# Patient Record
Sex: FEMALE
[De-identification: names, ages, dates, MRNs, and addresses within clinical notes are randomized per-mention and may not be internally consistent; named-entity substitution may affect disease eponyms.]

---

## 2024-03-04 PROBLEM — Z00.00 ENCOUNTER FOR PREVENTIVE HEALTH EXAMINATION: Status: ACTIVE | Noted: 2024-03-04

## 2024-03-19 ENCOUNTER — APPOINTMENT (OUTPATIENT)
Dept: OBGYN | Facility: CLINIC | Age: 24
End: 2024-03-19
Payer: MEDICAID

## 2024-03-19 VITALS
WEIGHT: 155 LBS | HEIGHT: 63 IN | BODY MASS INDEX: 27.46 KG/M2 | SYSTOLIC BLOOD PRESSURE: 116 MMHG | DIASTOLIC BLOOD PRESSURE: 74 MMHG

## 2024-03-19 DIAGNOSIS — N91.2 AMENORRHEA, UNSPECIFIED: ICD-10-CM

## 2024-03-19 DIAGNOSIS — Z01.419 ENCOUNTER FOR GYNECOLOGICAL EXAMINATION (GENERAL) (ROUTINE) W/OUT ABNORMAL FINDINGS: ICD-10-CM

## 2024-03-19 PROCEDURE — 99385 PREV VISIT NEW AGE 18-39: CPT

## 2024-03-19 RX ORDER — ASCORBIC ACID, CHOLECALCIFEROL, .ALPHA.-TOCOPHEROL ACETATE, DL-, PYRIDOXINE, FOLIC ACID, CYANOCOBALAMIN, CALCIUM, FERROUS FUMARATE, MAGNESIUM, DOCONEXENT 85; 200; 10; 25; 1; 12; 140; 27; 45; 300 [IU]/1; [IU]/1; [IU]/1; [IU]/1; MG/1; UG/1; MG/1; MG/1; MG/1; MG/1
27-0.6-0.4-3 CAPSULE, GELATIN COATED ORAL
Qty: 90 | Refills: 3 | Status: ACTIVE | COMMUNITY
Start: 2024-03-19 | End: 1900-01-01

## 2024-03-19 RX ORDER — ASPIRIN 81 MG/1
81 TABLET, DELAYED RELEASE ORAL
Qty: 150 | Refills: 2 | Status: ACTIVE | COMMUNITY
Start: 2024-03-19 | End: 1900-01-01

## 2024-03-19 RX ORDER — DOXYLAMINE SUCCINATE AND PYRIDOXINE HYDROCHLORIDE 10; 10 MG/1; MG/1
10-10 TABLET, DELAYED RELEASE ORAL
Qty: 30 | Refills: 1 | Status: ACTIVE | COMMUNITY
Start: 2024-03-19 | End: 1900-01-01

## 2024-03-20 LAB
ABO + RH PNL BLD: NORMAL
APPEARANCE: CLEAR
BACTERIA: NEGATIVE /HPF
BASOPHILS # BLD AUTO: 0.02 K/UL
BASOPHILS NFR BLD AUTO: 0.2 %
BILIRUBIN URINE: NEGATIVE
BLD GP AB SCN SERPL QL: NORMAL
BLOOD URINE: NEGATIVE
CAST: 0 /LPF
COLOR: YELLOW
EOSINOPHIL # BLD AUTO: 0.03 K/UL
EOSINOPHIL NFR BLD AUTO: 0.3 %
EPITHELIAL CELLS: 10 /HPF
ESTIMATED AVERAGE GLUCOSE: 103 MG/DL
GLUCOSE QUALITATIVE U: NEGATIVE MG/DL
HBA1C MFR BLD HPLC: 5.2 %
HBV SURFACE AG SER QL: NONREACTIVE
HCT VFR BLD CALC: 35.4 %
HCV AB SER QL: NONREACTIVE
HCV S/CO RATIO: 0.11 S/CO
HGB BLD-MCNC: 11.6 G/DL
HIV1+2 AB SPEC QL IA.RAPID: NONREACTIVE
IMM GRANULOCYTES NFR BLD AUTO: 0.1 %
KETONES URINE: NEGATIVE MG/DL
LEUKOCYTE ESTERASE URINE: ABNORMAL
LYMPHOCYTES # BLD AUTO: 1.84 K/UL
LYMPHOCYTES NFR BLD AUTO: 21 %
MAN DIFF?: NORMAL
MCHC RBC-ENTMCNC: 28.4 PG
MCHC RBC-ENTMCNC: 32.8 GM/DL
MCV RBC AUTO: 86.8 FL
MICROSCOPIC-UA: NORMAL
MONOCYTES # BLD AUTO: 0.47 K/UL
MONOCYTES NFR BLD AUTO: 5.4 %
NEUTROPHILS # BLD AUTO: 6.38 K/UL
NEUTROPHILS NFR BLD AUTO: 73 %
NITRITE URINE: NEGATIVE
PH URINE: 6
PLATELET # BLD AUTO: 328 K/UL
PROTEIN URINE: NEGATIVE MG/DL
RBC # BLD: 4.08 M/UL
RBC # FLD: 14.2 %
RED BLOOD CELLS URINE: 1 /HPF
SPECIFIC GRAVITY URINE: 1.02
UROBILINOGEN URINE: 0.2 MG/DL
WBC # FLD AUTO: 8.75 K/UL
WHITE BLOOD CELLS URINE: 4 /HPF

## 2024-03-21 LAB
C TRACH RRNA SPEC QL NAA+PROBE: NOT DETECTED
GLUCOSE SERPL-MCNC: 84 MG/DL
LEAD BLD-MCNC: <1 UG/DL
MEV IGG FLD QL IA: 92.3 AU/ML
MEV IGG+IGM SER-IMP: POSITIVE
MUV AB SER-ACNC: POSITIVE
MUV IGG SER QL IA: >300 AU/ML
N GONORRHOEA RRNA SPEC QL NAA+PROBE: NOT DETECTED
RUBV IGG FLD-ACNC: 10.7 INDEX
RUBV IGG SER-IMP: POSITIVE
SOURCE AMPLIFICATION: NORMAL
T PALLIDUM AB SER QL IA: NEGATIVE

## 2024-03-23 LAB
HGB A MFR BLD: 94.8 %
HGB A2 MFR BLD: 2.8 %
HGB F MFR BLD: 2.4 %
HGB FRACT BLD-IMP: NORMAL

## 2024-03-25 DIAGNOSIS — O23.40 UNSPECIFIED INFECTION OF URINARY TRACT IN PREGNANCY, UNSPECIFIED TRIMESTER: ICD-10-CM

## 2024-03-25 LAB — BACTERIA UR CULT: ABNORMAL

## 2024-03-26 ENCOUNTER — APPOINTMENT (OUTPATIENT)
Dept: ANTEPARTUM | Facility: CLINIC | Age: 24
End: 2024-03-26
Payer: MEDICAID

## 2024-03-26 ENCOUNTER — APPOINTMENT (OUTPATIENT)
Dept: OBGYN | Facility: CLINIC | Age: 24
End: 2024-03-26
Payer: MEDICAID

## 2024-03-26 ENCOUNTER — ASOB RESULT (OUTPATIENT)
Age: 24
End: 2024-03-26

## 2024-03-26 VITALS
BODY MASS INDEX: 27.64 KG/M2 | HEIGHT: 63 IN | SYSTOLIC BLOOD PRESSURE: 107 MMHG | WEIGHT: 156 LBS | DIASTOLIC BLOOD PRESSURE: 69 MMHG

## 2024-03-26 LAB
AR GENE MUT ANL BLD/T: NORMAL
CHROMOSOME13 INTERPRETATION: NORMAL
CHROMOSOME13 TEST RESULT: NORMAL
CHROMOSOME18 INTERPRETATION: NORMAL
CHROMOSOME18 TEST RESULT: NORMAL
CHROMOSOME21 INTERPRETATION: NORMAL
CHROMOSOME21 TEST RESULT: NORMAL
CYTOLOGY CVX/VAG DOC THIN PREP: NORMAL
FETAL FRACTION: NORMAL
FMR1 GENE MUT ANL BLD/T: NORMAL
M TB IFN-G BLD-IMP: NEGATIVE
PERFORMANCE AND LIMITATIONS: NORMAL
QUANTIFERON TB PLUS MITOGEN MINUS NIL: 4.76 IU/ML
QUANTIFERON TB PLUS NIL: 0.03 IU/ML
QUANTIFERON TB PLUS TB1 MINUS NIL: 0 IU/ML
QUANTIFERON TB PLUS TB2 MINUS NIL: 0 IU/ML
SEX CHROMOSOME INTERPRETATION: NORMAL
SEX CHROMOSOME TEST RESULT: NORMAL
VERIFI PRENATAL TEST: NOT DETECTED

## 2024-03-26 PROCEDURE — 76813 OB US NUCHAL MEAS 1 GEST: CPT | Mod: 59

## 2024-03-26 PROCEDURE — 76801 OB US < 14 WKS SINGLE FETUS: CPT

## 2024-03-26 PROCEDURE — 0501F PRENATAL FLOW SHEET: CPT

## 2024-03-26 RX ORDER — AMOXICILLIN AND CLAVULANATE POTASSIUM 500; 125 MG/1; MG/1
500-125 TABLET, FILM COATED ORAL TWICE DAILY
Qty: 14 | Refills: 0 | Status: ACTIVE | COMMUNITY
Start: 2024-03-25 | End: 1900-01-01

## 2024-03-27 NOTE — PLAN
[FreeTextEntry1] : 23 y/o female presenting for annual exam with amenorrhea -f/u PAP and GC/CT done today -f/u prenatal blood work drawn today -Rx sent for PNV, doxylamine pyridoxine  -Rx sent for bASA 81mg d/t h/o PEC/. S/S of PEC reviewed  -f/u NIPT done today  -RTO 1 weeks for first trimester ultrasound and review of prenatal lab results and NT

## 2024-03-27 NOTE — HISTORY OF PRESENT ILLNESS
Patient c/o chest pain, not sure of reflex, talked to her cardiologist , lilibeth follow up  - discussed with  - delivery at 37 weeks  - HIV today  - u/s - breech , discussed external version vs C/S - version scheduled 3//9/18 [N] : Patient does not use contraception [Y] : Positive pregnancy history [LMPDate] : 12/29/23 [PGHxTotal] : 2 [[facility]] : 1 [FreeTextEntry1] : C/S x1 d/t PEC (vision problems)  [[facility]] : 1

## 2024-03-31 LAB
ADDITIONAL US: NORMAL
CRL SCAN TWIN B: NORMAL
CRL SCAN: NORMAL
CROWN RUMP LENGTH TWIN B: NORMAL
CROWN RUMP LENGTH: 69.6 MM
DIA MOM: 2.22
DIA VALUE: 476.3 PG/ML
DOWN SYNDROME AGE RISK: NORMAL
DOWN SYNDROME INTERPRETATION: NORMAL
DOWN SYNDROME SCREENING RISK: NORMAL
FIRST TRIMESTER SCREEN COMMENTS: NORMAL
FIRST TRIMESTER SCREEN NOTE: NORMAL
FIRST TRIMESTER SCREEN RESULTS: NORMAL
FIRST TRIMESTER SCREEN TEST RESULTS: NORMAL
GEST. AGE ON COLLECTION DATE: 13 WEEKS
HCG MOM: 1.55
HCG VALUE: 129.2 IU/ML
MATERNAL AGE AT EDD: 24.6 YR
NT MOM TWIN B: NORMAL
NT TWIN B: NORMAL
NUCHAL TRANSLUCENCY (NT): 1.8 MM
NUCHAL TRANSLUCENCY MOM: 1.23
NUMBER OF FETUSES: 1
PAPP-A MOM: 0.75
PAPP-A VALUE: 865.5 NG/ML
RACE: NORMAL
SONOGRAPHER ID#: NORMAL
TRISOMY 18 AGE RISK: NORMAL
TRISOMY 18 INTERPRETATION: NORMAL
TRISOMY 18 SCREENING RISK: NORMAL
WEIGHT AFP: 156 LBS

## 2024-04-03 ENCOUNTER — NON-APPOINTMENT (OUTPATIENT)
Age: 24
End: 2024-04-03

## 2024-04-03 LAB — CFTR MUT TESTED BLD/T: POSITIVE

## 2024-04-06 LAB
3-BETA-HYDROXYSTEROID DEHYDROGENASE II: NEGATIVE
3-HYDROXY-3-METHYLGLUTARYL-COA LYASE DEF: NEGATIVE
3-METHYLCROTONYL-COA CARBOXYLASE 1 DEFIC: NEGATIVE
3-METHYLCROTONYL-COA CARBOXYLASE 2 DEFIC: NEGATIVE
3-PHOSPHOGLYCERATE DEHYDROGENASE DEFICIE: NEGATIVE
6-PYRUVOYL-TETRAHYDROPTERIN SYNTHASE: NEGATIVE
ABETALIPOPROTEINEMIA: NEGATIVE
ACHONDROGENESIS, TYPE 1B: NEGATIVE
ACHROMATOPSIA: NEGATIVE
ACRODERMATITIS ENTEROPATHICA: NEGATIVE
ACUTE INFANTILE LIVER FAILURE: NEGATIVE
ACYL-COA OXIDASE I DEFICIENCY: NEGATIVE
ADRENOLEUKODYSTROPHY: NEGATIVE
AICARDI-GOUTIÈRES SYNDROME: NEGATIVE
ALPHA THALASSEMIA MENTAL RETARDATION SYN: NEGATIVE
ALPHA-MANNOSIDOSIS: NEGATIVE
ALPHA-THALASSEMIA: NEGATIVE
ALPORT SYNDROME, COL4A3-RELATED: NEGATIVE
ALPORT SYNDROME, COL4A4-RELATED: NEGATIVE
ALPORT SYNDROME, X-LINKED: NEGATIVE
ALSTROM SYNDROME: NEGATIVE
ANDERMANN SYNDROME: NEGATIVE
ARGININOSUCCINATE LYASE DEFICIENCY: NEGATIVE
AROMATASE DEFICIENCY: NEGATIVE
ASPARAGINE SYNTHETASE DEFICIENCY: NEGATIVE
ASPARTYLGLYCOSAMINURIA: NEGATIVE
ATAXIA WITH VITAMIN E DEFICIENCY: NEGATIVE
ATAXIA-TELANGIECTASIA: NEGATIVE
AUTISM SPECTRUM, EPILEPSY AND ARTHROGRYP: NEGATIVE
AUTOSOMAL RECESSIVE SPASTIC ATAXIA OF CH: NEGATIVE
BARDET-BIEDL SYNDROME, BBS1-RELATED: NEGATIVE
BARDET-BIEDL SYNDROME, BBS10-RELATED: NEGATIVE
BARDET-BIEDL SYNDROME, BBS12-RELATED: NEGATIVE
BARDET-BIEDL SYNDROME, BBS2-RELATED: NEGATIVE
BARE LYMPHOCYTE SYNDROME, TYPE II: NEGATIVE
BARTTER SYNDROME: NEGATIVE
BATTEN DISEASE: NEGATIVE
BETA-HEMOGLOBINOPATHIES: NEGATIVE
BILATERAL FRONTOPARIETAL POLYMICROGYRIA: NEGATIVE
BIOTINIDASE DEFICIENCY: NEGATIVE
BLOOM SYNDROME: NEGATIVE
CANAVAN DISEASE: NEGATIVE
CARBAMOYL PHOSPHATE SYNTHETASE I DEF: NEGATIVE
CARNITINE DEFICIENCY: NEGATIVE
CARNITINE PALMITOYLTRANSFERASE IA DEF: NEGATIVE
CARNITINE PALMITOYLTRANSFERASE II DEF: NEGATIVE
CARPENTER SYNDROME: NEGATIVE
CARTILAGE-HAIR HYPOPLASIA: NEGATIVE
CEREBROTENDINOUS XANTHOMATOSIS: NEGATIVE
CFTR MUT ANL BLD/T: POSITIVE
CHARCOT-MARIE-TOOTH DISEASE WITH DEAFNES: NEGATIVE
CHARCOT-MARIE-TOOTH DISEASE, TYPE 4D: NEGATIVE
CHOREOACANTHOCYTOSIS: NEGATIVE
CHOROIDEREMIA: NEGATIVE
CHRONIC GRANULOMATOUS DISEASE, CYTOCHROM: NEGATIVE
CHRONIC GRANULOMATOUS DISEASE, X-LINKED: NEGATIVE
CILIOPATHIES, RPGRIP1L-RELATED: NEGATIVE
CITRIN DEFICIENCY: NEGATIVE
CITRULLINEMIA, TYPE I: NEGATIVE
COHEN SYNDROME: NEGATIVE
COMBINED MALONIC AND METHYLMALONIC ACIDU: NEGATIVE
COMBINED OXIDATIVE PHOSPHORYLATION DEF 3: NEGATIVE
COMBINED OXIDATIVE PHOSPHORYLATION DEF 4: NEGATIVE
COMBINED PITUITARY HORMONE DEFICIENCY-2: NEGATIVE
CONGENITAL ADRENAL HYPERPLASIA, 17-ALPHA: NEGATIVE
CONGENITAL AMEGAKARYOCYTIC THROMBOCYTOPE: NEGATIVE
CONGENITAL DISORDER OF GLYCOSYLATION 1C: NEGATIVE
CONGENITAL DISORDER OF GLYCOSYLATION IA: NEGATIVE
CONGENITAL DISORDER OF GLYCOSYLATION IB: NEGATIVE
CONGENITAL FINNISH NEPHROSIS: NEGATIVE
CONGENITAL INSENSIVITY TO PAIN WITH ANHI: NEGATIVE
CONGENITAL MYASTHENIC SYNDROME, CHRNE: NEGATIVE
CONGENITAL MYASTHENIC SYNDROME, RAPSN-RE: NEGATIVE
CONGENITAL NEUTROPENIA, HAX1-RELATED: NEGATIVE
CONGENITAL NEUTROPENIA, VPS45-RELATED: NEGATIVE
CORNEAL DYSTROPHY AND PERCEPTIVE DEAFNES: NEGATIVE
CORTICOSTERONE METHYLOXIDASE DEFICIENCY: NEGATIVE
COSTEFF DISEASE OPTIC ATROPHY: NEGATIVE
CRB1-RELATED RETINAL DYSTROPHIES: NEGATIVE
CREATINE TRANSPORTER DEFECT: NEGATIVE
CYSTINOSIS: NEGATIVE
D-BIFUNCTIONAL PROTEIN DEFICIENCY: NEGATIVE
DEAFNESS, AUTOSOMAL RECESSIVE 77: NEGATIVE
DMD GENE MUT ANL BLD/T: NEGATIVE
DYSKERATOSIS CONGENITA: NEGATIVE
DYSTROPHIC EPIDERMOLYSIS BULLOSA: NEGATIVE
EHLERS-DANLOS SYNDROME, TYPE VIIC: NEGATIVE
ELLIS-VAN CREVELD SYNDOME: NEGATIVE
EMERY-DREIFUSS MUSCULAR DYSTROPHY 1 X-LI: NEGATIVE
ENHANCED S-CONE SYNDROME: NEGATIVE
ETHYLMALONIC ENCEPHALOPATHY: NEGATIVE
FABRY DISEASE: NEGATIVE
FACTOR IX DEFICIENCY: NEGATIVE
FACTOR XI DEFICIENCY: NEGATIVE
FAMILIAL DYSAUTONOMIA: NEGATIVE
FAMILIAL HYPERCHOLESTEROLEMIA, LDLR: NEGATIVE
FAMILIAL HYPERCHOLESTEROLEMIA, LDLRAP1: NEGATIVE
FAMILIAL HYPERINSULINISM: NEGATIVE
FAMILIAL MEDITERRANEAN FEVER: NEGATIVE
FAMILIAL NEUROPOPHYSEAL DIABETES INSIPID: NEGATIVE
FANCONI ANEMIA, GROUP A: NEGATIVE
FANCONI ANEMIA, GROUP C: NEGATIVE
FANCONI ANEMIA, GROUP G: NEGATIVE
FRAGILE X PROTEIN (FMRP) BLD-IMP: NEGATIVE
FUMARASE DEFICIENCY: NEGATIVE
GALACTOKINASE DEFICIENCY: NEGATIVE
GALACTOSEMIA: NEGATIVE
GAUCHER DISEASE: NEGATIVE
GITELMAN SYNDROME: NEGATIVE
GLUTARIC ACIDEMIA, TYPE 2A: NEGATIVE
GLUTARIC ACIDEMIA, TYPE 2C: NEGATIVE
GLUTARYL-COA DEHYDROGENASE DEFICIENCY: NEGATIVE
GLYCINE ENCEPHALOPATHY, AMT-RELATED: NEGATIVE
GLYCINE ENCEPHALOPATHY: NEGATIVE
GLYCOGEN STORAGE DISEASE, TYPE 1A: NEGATIVE
GLYCOGEN STORAGE DISEASE, TYPE 2: NEGATIVE
GLYCOGEN STORAGE DISEASE, TYPE 3: NEGATIVE
GLYCOGEN STORAGE DISEASE, TYPE 4: NEGATIVE
GLYCOGEN STORAGE DISEASE, TYPE 5: NEGATIVE
GLYCOGEN STORAGE DISEASE, TYPE IB: NEGATIVE
GLYCOGEN STORAGE DISEASE, TYPE VII: NEGATIVE
GRACILE SYNDROME: NEGATIVE
GUANIDINOACETATE METHYLTRANSFERASE DEFIC: NEGATIVE
HEMOCHROMATOSIS, TYPE 2A: NEGATIVE
HEMOCHROMATOSIS, TYPE 3, TFR2-RELATED: NEGATIVE
HEREDITARY FRUCTOSE INTOLERANCE: NEGATIVE
HEREDITARY SPASTIC PARAPARESIS, TYPE 49: NEGATIVE
HERMANSKY-PUDLAK SYNDROME, HPS1-RELATED: NEGATIVE
HERMANSKY-PUDLAK SYNDROME, HPS3-RELATED: NEGATIVE
HEXOSAMINIDASE A & B BLD-IMP: NEGATIVE
HOLOCARBOXYLASE SYNTHETASE DEFICIENCY: NEGATIVE
HOMOCYSTINURIA DUE TO DEFIC OF MTHFR: NEGATIVE
HOMOCYSTINURIA, TYPE CBLE: NEGATIVE
HOMOCYSTINURIA: NEGATIVE
HYDROLETHALUS SYNDROME: NEGATIVE
HYPERINSULINEMIC HYPOGLYCEMIA: NEGATIVE
HYPERORNITHINEMIA-HYPERAMMONEMIA-HOMOCIT: NEGATIVE
HYPOHIDROTIC ECTODERMAL DYSPLASIA X-LINK: NEGATIVE
HYPOPHOSPHATASIA, AUTOSOMAL RECESSIVE: NEGATIVE
INCLUSION BODY MYOPATHY 2: NEGATIVE
INFANTILE CEREBRAL AND CEREBELLAR ATROPH: NEGATIVE
ISOVALERIC ACIDEMIA: NEGATIVE
JOUBERT SYNDROME 2: NEGATIVE
KETOTHIOLASE DEFICIENCY: NEGATIVE
KRABBE DISEASE: NEGATIVE
LAMELLAR ICHTHYOSIS, TYPE 1: NEGATIVE
LEBER CONGENITAL AMAUROSIS 2: NEGATIVE
LEBER CONGENITAL AMAUROSIS, TYPE CEP290: NEGATIVE
LEBER CONGENITAL AMAUROSIS, TYPE LCA5: NEGATIVE
LEBER CONGENITAL AMAUROSIS, TYPE RDH12: NEGATIVE
LEIGH SYNDROME, FRENCH-CANADIAN TYPE: NEGATIVE
LETHAL CONGENITAL CONTRACTURE SYNDROME 1: NEGATIVE
LEUKOENCEPHALOPATHY WITH VANISHING WHITE: NEGATIVE
LIMB GIRDLE MUSCULAR DYSTROPHY, TYPE 2A: NEGATIVE
LIMB GIRDLE MUSCULAR DYSTROPHY, TYPE 2B: NEGATIVE
LIMB GIRDLE MUSCULAR DYSTROPHY, TYPE 2I: NEGATIVE
LIMB-GIRDLE MUSCULAR DYSTROPHY, TYPE 2C: NEGATIVE
LIMB-GIRDLE MUSCULAR DYSTROPHY, TYPE 2D: NEGATIVE
LIMB-GIRDLE MUSCULAR DYSTROPHY, TYPE 2E: NEGATIVE
LIPOAMIDE DEHYDROGENASE DEFICIENCY: NEGATIVE
LIPOID ADRENAL HYPERPLASIA: NEGATIVE
LIPOPROTEIN LIPASE DEFICIENCY: NEGATIVE
LONG CHAIN 3-HYDROXYACYL-COA DEHYDROGENA: NEGATIVE
LYSINURIC PROTEIN INTOLERANCE: NEGATIVE
MAPLE SYRUP URINE DISEASE, TYPE 1A: NEGATIVE
MAPLE SYRUP URINE DISEASE, TYPE 1B: NEGATIVE
MECKEL-GRUBER SYNDROME, TYPE 1: NEGATIVE
MEDIUM CHAIN ACYL-COA DEHYDROGENASE DEFICIENCY: NEGATIVE
MEGALENCEPHALIC LEUKOENCEPHALOPATHY: NEGATIVE
METACHROMATIC LEUKODYSTROPHY GAUCHER: NEGATIVE
METACHROMATIC LEUKODYSTROPHY, ARSA: NEGATIVE
METHYLMALONIC ACIDURIA AND HOMOCYST CBIC: NEGATIVE
METHYLMALONIC ACIDURIA AND HOMOCYST CBID: NEGATIVE
METHYLMALONIC ACIDURIA, MMAA-RELATED: NEGATIVE
METHYLMALONIC ACIDURIA, MMAB-RELATED: NEGATIVE
METHYLMALONIC ACIDURIA, TYPE MUT(0): NEGATIVE
MICROPHTHALMIA/ANOPHTHALMIA: NEGATIVE
MITOCHONDRIAL COMPLEX 1 DEFIC NDUFAF5: NEGATIVE
MITOCHONDRIAL COMPLEX 1 DEFIC NDUFS6: NEGATIVE
MITOCHONDRIAL DNA DEPLETION SYNDROME 6: NEGATIVE
MITOCHONDRIAL MYOPATHY AND SIDEROBLASTIC: NEGATIVE
MUCOLIPIDOSIS II/IIIA: NEGATIVE
MUCOLIPIDOSIS III GAMMA: NEGATIVE
MUCOLIPIDOSIS, TYPE IV: NEGATIVE
MUCOPOLYSACCHARIDOSIS, TYPE I: NEGATIVE
MUCOPOLYSACCHARIDOSIS, TYPE II: NEGATIVE
MUCOPOLYSACCHARIDOSIS, TYPE IIIA: NEGATIVE
MUCOPOLYSACCHARIDOSIS, TYPE IIIB: NEGATIVE
MUCOPOLYSACCHARIDOSIS, TYPE IIIC: NEGATIVE
MUCOPOLYSACCHARIDOSIS, TYPE IIID: NEGATIVE
MUCOPOLYSACCHARIDOSIS, TYPE IVB: NEGATIVE
MUCOPOLYSACCHARIDOSIS, TYPE IX: NEGATIVE
MUCOPOLYSACCHARIDOSIS, TYPE VI: NEGATIVE
MULTIPLE SULPHATASE DEFICIENCY: NEGATIVE
MUSCLE-EYE-BRAIN DISEASE, POMGNT1-RELATE: NEGATIVE
MYONEUROGASTROINTESTINAL ENCEPHALOPATHY: NEGATIVE
MYOTUBULAR MYOPATHY, X-LINKED: NEGATIVE
N-ACETYLGLUTAMATE SYNTHASE DEFICIENCY: NEGATIVE
NEMALINE MYOPATHY: NEGATIVE
NEURONAL CEROID LIPOFUSCINOSIS, CLN5: NEGATIVE
NEURONAL CEROID LIPOFUSCINOSIS, MFSD8: NEGATIVE
NEURONAL CEROID LIPOFUSCINOSIS, PPT1-REL: NEGATIVE
NEURONAL CEROID LIPOFUSCINOSIS, TPP1-REL: NEGATIVE
NEURONAL CEROID-LIPOFUSCINOSIS, CLN6: NEGATIVE
NEURONAL CEROID-LIPOFUSCINOSIS, CLN8: NEGATIVE
NIEMANN PICK DISEASE, TYPE C1/D: NEGATIVE
NIEMANN PICK DISEASE, TYPE C2: NEGATIVE
NIEMANN-PICK DISEASE, TYPES A/B: NEGATIVE
NIJMEGEN BREAKAGE SYNDROME: NEGATIVE
NON-SYNDROMIC HEARING LOSS, GJB2-RELATED: NEGATIVE
OCCIPITAL HORN SYNDROME: NEGATIVE
ODONTO-ONYCHO-DERMAL DYSPLASIA: NEGATIVE
OMENN SYNDROME: NEGATIVE
ORNITHINE AMINOTRANSFERASE DEFICIENCY: NEGATIVE
ORNITHINE TRANSCARBAMYLASE DEFICIENCY: NEGATIVE
OSTEOPETROSIS, INFANTILE MALIGNANT: NEGATIVE
PANEL NOTES: NORMAL
PENDRED SYNDROME: NEGATIVE
PHENYLKETONURIA: NEGATIVE
PITUITARY HORMONE DEFICIENCY, COMBINED 3: NEGATIVE
POLYCYSTIC KIDNEY DISEASE, AUTOSOMAL RECESSIVE: NEGATIVE
POLYGLANDULAR AUTOIMMUNE SYNDROME: NEGATIVE
PONTOCEREBELLAR HYPOPLASIA, RARS2-RELATE: NEGATIVE
PONTOCEREBELLAR HYPOPLASIA, TYPE 1A: NEGATIVE
PRIMARY CILIARY DYSKINESIA DNAH5-RELATED: NEGATIVE
PRIMARY CILIARY DYSKINESIA DNAI1-RELATED: NEGATIVE
PRIMARY CILIARY DYSKINESIA DNAI2-RELATED: NEGATIVE
PRIMARY HYPEROXALURIA, TYPE 1: NEGATIVE
PRIMARY HYPEROXALURIA, TYPE 2: NEGATIVE
PRIMARY HYPEROXALURIA, TYPE 3: NEGATIVE
PROGRESSIVE CEREBELLO-CEREBRAL ATROPHY: NEGATIVE
PROGRESSIVE FAMILIAL INTRAHEPATIC CHOLES: NEGATIVE
PROPIONIC ACIDEMIA, ALPHA SUBUNIT: NEGATIVE
PROPIONIC ACIDEMIA, BETA SUBUNIT: NEGATIVE
PYCNODYSOSTOSIS: NEGATIVE
PYRUVATE DEHYDROGENASE DEFICIENCY AUTOSO: NEGATIVE
PYRUVATE DEHYDROGENASE DEFICIENCY X-LINK: NEGATIVE
RENAL TUBULAR ACIDOSIS AND DEAFNESS: NEGATIVE
REPRODUCTIVE CARRIER MULTIGENE ANL BLD/T: POSITIVE
RETINITIS PIGMENTOSA 25: NEGATIVE
RETINITIS PIGMENTOSA 26: NEGATIVE
RETINITIS PIGMENTOSA 28: NEGATIVE
RETINITIS PIGMENTOSA 59: NEGATIVE
RHIZOMELIC CHONDRODYSPLASIA PUNCTATA 3: NEGATIVE
RHIZOMELIC CHONDRODYSPLASIA PUNCTATA I: NEGATIVE
RIBOFLAVIN RESPONSIVE COMPLEX 1 DEF: NEGATIVE
ROBERTS SYNDROME: NEGATIVE
RPT COMMENT: NORMAL
SALLA DISEASE: NEGATIVE
SANDHOFF DISEASE: NEGATIVE
SCHIMKE IMMUNOOSSEOUS DYSPLASIA: NEGATIVE
SEGAWA SYNDROME, AUTOSOMAL RECESSIVE: NEGATIVE
SEVERE COMBINED IMMUNODEFICIENCY, TYPE A: NEGATIVE
SEVERE COMBINED IMMUNODEFICIENCY: NEGATIVE
SJOGREN-LARSSON SYNDROME: NEGATIVE
SMITH-LEMLI-OPITZ SYNDROME: NEGATIVE
SMN1 GENE MUT ANL BLD/T: NEGATIVE
SPONDYLOTHORACIC DYSOSTOSIS: NEGATIVE
STEROID-RESISTANT NEPHROTIC SYNDROME: NEGATIVE
STUVE-WIEDEMANN SYNDROME: NEGATIVE
TEST PERFORMANCE INFO SPEC: NORMAL
TYROSINEMIA, TYPE I: NEGATIVE
USHER SYNDROME, TYPE 1B: NEGATIVE
USHER SYNDROME, TYPE 1C: NEGATIVE
USHER SYNDROME, TYPE 1D: NEGATIVE
USHER SYNDROME, TYPE 1F: NEGATIVE
USHER SYNDROME, TYPE 2A: NEGATIVE
USHER SYNDROME, TYPE 3: NEGATIVE
VERY LONG CHAIN ACYL-COA DEHYDROGENASE: NEGATIVE
WALKER-WARBURG SYNDROME: NEGATIVE
WILSON DISEASE: NEGATIVE
WOLMAN DISEASE: NEGATIVE
X-LINKED JUVENILE RETINOSCHISIS: NEGATIVE
X-LINKED SEVERE COMBINED IMMUNODEFICIENC: NEGATIVE
ZELLWEGER SPECTRUM DISORDERS, PEX1-RELAT: NEGATIVE
ZELLWEGER SPECTRUM DISORDERS, PEX10-RELA: NEGATIVE
ZELLWEGER SPECTRUM DISORDERS, PEX2-RELAT: NEGATIVE
ZELLWEGER SPECTRUM DISORDERS, PEX6-RELAT: NEGATIVE

## 2024-04-23 ENCOUNTER — APPOINTMENT (OUTPATIENT)
Dept: OBGYN | Facility: CLINIC | Age: 24
End: 2024-04-23
Payer: MEDICAID

## 2024-04-23 VITALS — DIASTOLIC BLOOD PRESSURE: 71 MMHG | SYSTOLIC BLOOD PRESSURE: 114 MMHG | WEIGHT: 158 LBS

## 2024-04-23 PROCEDURE — 0502F SUBSEQUENT PRENATAL CARE: CPT

## 2024-04-30 LAB
AFP MOM: 0.98
AFP VALUE: 34.6 NG/ML
ALPHA FETOPROTEIN SERUM COMMENT: NORMAL
ALPHA FETOPROTEIN SERUM INTERPRETATION: NORMAL
ALPHA FETOPROTEIN SERUM RESULTS: NORMAL
ALPHA FETOPROTEIN SERUM TEST RESULTS: NORMAL
GESTATIONAL AGE BASED ON: NORMAL
GESTATIONAL AGE ON COLLECTION DATE: 16.6 WEEKS
INSULIN DEP DIABETES: NO
MATERNAL AGE AT EDD AFP: 24.6 YR
MULTIPLE GESTATION: NO
OSBR RISK 1 IN: NORMAL
RACE: NORMAL
WEIGHT AFP: 158 LBS

## 2024-05-21 ENCOUNTER — APPOINTMENT (OUTPATIENT)
Dept: ANTEPARTUM | Facility: CLINIC | Age: 24
End: 2024-05-21
Payer: MEDICAID

## 2024-05-21 ENCOUNTER — APPOINTMENT (OUTPATIENT)
Dept: OBGYN | Facility: CLINIC | Age: 24
End: 2024-05-21
Payer: MEDICAID

## 2024-05-21 ENCOUNTER — ASOB RESULT (OUTPATIENT)
Age: 24
End: 2024-05-21

## 2024-05-21 VITALS
HEIGHT: 63 IN | SYSTOLIC BLOOD PRESSURE: 103 MMHG | BODY MASS INDEX: 29.41 KG/M2 | WEIGHT: 166 LBS | DIASTOLIC BLOOD PRESSURE: 63 MMHG

## 2024-05-21 PROCEDURE — 76805 OB US >/= 14 WKS SNGL FETUS: CPT

## 2024-05-21 PROCEDURE — 0502F SUBSEQUENT PRENATAL CARE: CPT

## 2024-06-18 ENCOUNTER — APPOINTMENT (OUTPATIENT)
Dept: OBGYN | Facility: CLINIC | Age: 24
End: 2024-06-18
Payer: MEDICAID

## 2024-06-18 VITALS — WEIGHT: 170 LBS | DIASTOLIC BLOOD PRESSURE: 72 MMHG | SYSTOLIC BLOOD PRESSURE: 109 MMHG

## 2024-06-18 DIAGNOSIS — Z34.90 ENCOUNTER FOR SUPERVISION OF NORMAL PREGNANCY, UNSPECIFIED, UNSPECIFIED TRIMESTER: ICD-10-CM

## 2024-06-18 PROCEDURE — 0502F SUBSEQUENT PRENATAL CARE: CPT

## 2024-07-16 ENCOUNTER — APPOINTMENT (OUTPATIENT)
Dept: OBGYN | Facility: CLINIC | Age: 24
End: 2024-07-16
Payer: MEDICAID

## 2024-07-16 VITALS — DIASTOLIC BLOOD PRESSURE: 76 MMHG | WEIGHT: 175 LBS | SYSTOLIC BLOOD PRESSURE: 119 MMHG

## 2024-07-16 DIAGNOSIS — Z34.90 ENCOUNTER FOR SUPERVISION OF NORMAL PREGNANCY, UNSPECIFIED, UNSPECIFIED TRIMESTER: ICD-10-CM

## 2024-07-16 PROCEDURE — 0502F SUBSEQUENT PRENATAL CARE: CPT

## 2024-07-16 RX ORDER — FERRIC MALTOL 30 MG/1
30 CAPSULE ORAL
Qty: 3 | Refills: 0 | Status: ACTIVE | COMMUNITY
Start: 2024-07-16 | End: 1900-01-01

## 2024-07-30 ENCOUNTER — APPOINTMENT (OUTPATIENT)
Dept: OBGYN | Facility: CLINIC | Age: 24
End: 2024-07-30
Payer: MEDICAID

## 2024-07-30 ENCOUNTER — APPOINTMENT (OUTPATIENT)
Dept: ANTEPARTUM | Facility: CLINIC | Age: 24
End: 2024-07-30
Payer: MEDICAID

## 2024-07-30 ENCOUNTER — ASOB RESULT (OUTPATIENT)
Age: 24
End: 2024-07-30

## 2024-07-30 VITALS — WEIGHT: 176 LBS | SYSTOLIC BLOOD PRESSURE: 118 MMHG | DIASTOLIC BLOOD PRESSURE: 82 MMHG

## 2024-07-30 PROCEDURE — 76819 FETAL BIOPHYS PROFIL W/O NST: CPT

## 2024-07-30 PROCEDURE — 76816 OB US FOLLOW-UP PER FETUS: CPT

## 2024-07-30 PROCEDURE — 0502F SUBSEQUENT PRENATAL CARE: CPT

## 2024-08-15 ENCOUNTER — APPOINTMENT (OUTPATIENT)
Dept: OBGYN | Facility: CLINIC | Age: 24
End: 2024-08-15
Payer: MEDICAID

## 2024-08-15 VITALS
BODY MASS INDEX: 31.71 KG/M2 | DIASTOLIC BLOOD PRESSURE: 75 MMHG | WEIGHT: 179 LBS | SYSTOLIC BLOOD PRESSURE: 121 MMHG | HEIGHT: 63 IN

## 2024-08-15 PROCEDURE — 0502F SUBSEQUENT PRENATAL CARE: CPT

## 2024-08-15 PROCEDURE — 90471 IMMUNIZATION ADMIN: CPT

## 2024-08-15 PROCEDURE — 90715 TDAP VACCINE 7 YRS/> IM: CPT

## 2024-08-15 RX ORDER — ONDANSETRON 4 MG/1
4 TABLET ORAL
Qty: 15 | Refills: 0 | Status: ACTIVE | COMMUNITY
Start: 2024-08-15 | End: 1900-01-01

## 2024-08-29 ENCOUNTER — APPOINTMENT (OUTPATIENT)
Dept: OBGYN | Facility: CLINIC | Age: 24
End: 2024-08-29
Payer: MEDICAID

## 2024-08-29 ENCOUNTER — NON-APPOINTMENT (OUTPATIENT)
Age: 24
End: 2024-08-29

## 2024-08-29 VITALS — SYSTOLIC BLOOD PRESSURE: 122 MMHG | DIASTOLIC BLOOD PRESSURE: 75 MMHG | WEIGHT: 181 LBS

## 2024-08-29 DIAGNOSIS — Z34.90 ENCOUNTER FOR SUPERVISION OF NORMAL PREGNANCY, UNSPECIFIED, UNSPECIFIED TRIMESTER: ICD-10-CM

## 2024-08-29 PROCEDURE — 0502F SUBSEQUENT PRENATAL CARE: CPT

## 2024-09-12 ENCOUNTER — APPOINTMENT (OUTPATIENT)
Dept: OBGYN | Facility: CLINIC | Age: 24
End: 2024-09-12
Payer: MEDICAID

## 2024-09-12 DIAGNOSIS — Z34.90 ENCOUNTER FOR SUPERVISION OF NORMAL PREGNANCY, UNSPECIFIED, UNSPECIFIED TRIMESTER: ICD-10-CM

## 2024-09-12 PROCEDURE — 0502F SUBSEQUENT PRENATAL CARE: CPT

## 2024-09-15 LAB
B-HEM STREP SPEC QL CULT: NORMAL
BASOPHILS # BLD AUTO: 0.03 K/UL
BASOPHILS NFR BLD AUTO: 0.3 %
EOSINOPHIL # BLD AUTO: 0.03 K/UL
EOSINOPHIL NFR BLD AUTO: 0.3 %
HCT VFR BLD CALC: 36.1 %
HGB BLD-MCNC: 11.3 G/DL
HIV1+2 AB SPEC QL IA.RAPID: NONREACTIVE
IMM GRANULOCYTES NFR BLD AUTO: 0.4 %
LYMPHOCYTES # BLD AUTO: 2.04 K/UL
LYMPHOCYTES NFR BLD AUTO: 21.7 %
MAN DIFF?: NORMAL
MCHC RBC-ENTMCNC: 29.6 PG
MCHC RBC-ENTMCNC: 31.3 GM/DL
MCV RBC AUTO: 94.5 FL
MONOCYTES # BLD AUTO: 0.46 K/UL
MONOCYTES NFR BLD AUTO: 4.9 %
NEUTROPHILS # BLD AUTO: 6.81 K/UL
NEUTROPHILS NFR BLD AUTO: 72.4 %
PLATELET # BLD AUTO: 292 K/UL
RBC # BLD: 3.82 M/UL
RBC # FLD: 14.9 %
WBC # FLD AUTO: 9.41 K/UL

## 2024-09-19 ENCOUNTER — APPOINTMENT (OUTPATIENT)
Dept: ANTEPARTUM | Facility: CLINIC | Age: 24
End: 2024-09-19
Payer: MEDICAID

## 2024-09-19 ENCOUNTER — ASOB RESULT (OUTPATIENT)
Age: 24
End: 2024-09-19

## 2024-09-19 ENCOUNTER — APPOINTMENT (OUTPATIENT)
Dept: OBGYN | Facility: CLINIC | Age: 24
End: 2024-09-19

## 2024-09-19 VITALS — WEIGHT: 191.13 LBS | SYSTOLIC BLOOD PRESSURE: 112 MMHG | DIASTOLIC BLOOD PRESSURE: 71 MMHG

## 2024-09-19 PROCEDURE — 76819 FETAL BIOPHYS PROFIL W/O NST: CPT

## 2024-09-19 PROCEDURE — 76816 OB US FOLLOW-UP PER FETUS: CPT

## 2024-09-19 PROCEDURE — 0502F SUBSEQUENT PRENATAL CARE: CPT

## 2024-09-23 ENCOUNTER — OUTPATIENT (OUTPATIENT)
Dept: OUTPATIENT SERVICES | Facility: HOSPITAL | Age: 24
LOS: 1 days | End: 2024-09-23

## 2024-09-23 VITALS
TEMPERATURE: 99 F | RESPIRATION RATE: 16 BRPM | OXYGEN SATURATION: 97 % | DIASTOLIC BLOOD PRESSURE: 70 MMHG | HEIGHT: 64.5 IN | WEIGHT: 182.1 LBS | HEART RATE: 74 BPM | SYSTOLIC BLOOD PRESSURE: 102 MMHG

## 2024-09-23 DIAGNOSIS — Z98.891 HISTORY OF UTERINE SCAR FROM PREVIOUS SURGERY: Chronic | ICD-10-CM

## 2024-09-23 DIAGNOSIS — O34.211 MATERNAL CARE FOR LOW TRANSVERSE SCAR FROM PREVIOUS CESAREAN DELIVERY: ICD-10-CM

## 2024-09-23 LAB
APPEARANCE UR: ABNORMAL
BILIRUB UR-MCNC: NEGATIVE — SIGNIFICANT CHANGE UP
BLD GP AB SCN SERPL QL: NEGATIVE — SIGNIFICANT CHANGE UP
COLOR SPEC: SIGNIFICANT CHANGE UP
DIFF PNL FLD: NEGATIVE — SIGNIFICANT CHANGE UP
GLUCOSE UR QL: NEGATIVE MG/DL — SIGNIFICANT CHANGE UP
KETONES UR-MCNC: ABNORMAL MG/DL
LEUKOCYTE ESTERASE UR-ACNC: ABNORMAL
NITRITE UR-MCNC: NEGATIVE — SIGNIFICANT CHANGE UP
PH UR: 7.5 — SIGNIFICANT CHANGE UP (ref 5–8)
PROT UR-MCNC: SIGNIFICANT CHANGE UP MG/DL
RH IG SCN BLD-IMP: POSITIVE — SIGNIFICANT CHANGE UP
SP GR SPEC: 1.02 — SIGNIFICANT CHANGE UP (ref 1–1.03)
UROBILINOGEN FLD QL: 1 MG/DL — SIGNIFICANT CHANGE UP (ref 0.2–1)

## 2024-09-23 NOTE — OB PST NOTE - NSICDXPASTMEDICALHX_GEN_ALL_CORE_FT
PAST MEDICAL HISTORY:  H/O eye disorder     Maternal care for low transverse scar from previous  delivery

## 2024-09-23 NOTE — OB PST NOTE - PROBLEM SELECTOR PLAN 1
preop for repeat  section on 24  preop instructions given, pt verbalized understanding  chlorhexidine wash provided  PST cbc anemia workup, type, UA pending

## 2024-09-23 NOTE — OB PST NOTE - HISTORY OF PRESENT ILLNESS
25 y/o Mohawk speaking female presents to PST preop for repeat  section. Pt had primary  section in  in [facility] due to concerns about intraocular pressure with vaginal delivery.

## 2024-09-23 NOTE — OB PST NOTE - NSHPPHYSICALEXAM_GEN_ALL_CORE
wound check Constitutional: Well Developed, Well Groomed, Well Nourished, No Distress    Eyes: PERRL, EOMI, conjunctiva clear    Ears: Normal    Mouth & Gums: Normal, moist    Pharynx: No tenderness, discharge, or peritonsillar abscess    Tonsils: No Redness, discharge, tenderness, or swelling    Neck: Supple, no JVD, normal thyroid glands, no carotid bruits, no cervical vertebral or paraspinal tenderness    Breast: Normal shape    Back: Normal shape, ROM intact, strength intact, no vertebral tenderness    Respiratory: Airway patent, breath sounds equal, good air movement, respiration non-labored, clear to auscultation bilateral, no chest wall tenderness    Cardiovascular:  Regular rate and rhythm, no rubs or murmur, normal PMI    Gastrointestinal: pregnant abdomen. pt reports fetal movement.     Extremities: No clubbing, cyanosis, or pedal edema    Vascular:  Carotid Pulse normal , Radial Pulse normal    Neurological: alert & oriented x 3, sensation intact, deep reflexes intact, cranial nerve intact, normal strength    Skin: warm and dry, normal color    Lymph Nodes: normal posterior cervical lymph node, normal anterior cervical lymph node, normal supraclavicular lymph node, normal axillary lymph node    Musculoskeletal: ROM intact, no joint swelling, warmth, or calf tenderness. Normal strength    Psychiatric: normal affect, normal behavior

## 2024-09-23 NOTE — OB PST NOTE - NSSUBSTANCEUSE_GEN_ALL_CORE_SD
Pt arrived in PACU at 6659. Neuro assessment revealed RLE weakness to plantar flexion and dorsiflexion with numbness. Preop - pt exhibited RLE weakness and numbness, but worse post surgery per pt. Dr. Ron Cruz called and made aware, en route to see pt. Dr. Ron Cruz at bedside at [phone], pt continued with RLE weakness and numbness but revealed no other neuro deficits at that time. Pt ready to go to room after this, but Dr. Ron Cruz called PACU and wanted Dr. Aye Padgett to see pt in PACU to ensure no new symptoms and no need for imaging. Dr. Aye Padgett arrived at bedside at [phone] to assess pt. Again, only noticed RLE weakness that was improving since in PACU. Pt able to slightly flex ankle at this time, no movement of toes, still noting numbness but possessed ability to distinguish touch. Final PACU neuro assessment completed at [phone] before leaving for ICU room at [phone]. At that time, RLE weakness continued, but had improved throughout PACU stay, no facial droop noted, A&O x 3, BUE strong with int. tremor with movement (baseline), continued with numbness only to RLE. PERRLA. Upon arrival to ICU room and completing bedside neuro exam with Kenneth Naidu RN significant facial droop noted. Kenneth Naidu RN alerting physicians. No other questions at this time.
caffeine

## 2024-09-23 NOTE — OB PST NOTE - ASSESSMENT
23 y/o Bulgarian speaking female presents to PST preop for repeat  section. Pt had primary  section in  in [facility] due to concerns about intraocular pressure with vaginal delivery.

## 2024-09-23 NOTE — OB PST NOTE - NSHPREVIEWOFSYSTEMS_GEN_ALL_CORE
General: pregnancy weight gain. + fever 3 weeks ago, tested positive for COVID. fever since resolved. No chills, sweating, anorexia, weight loss. No polyphagia, polyuria, polydipsia, malaise, or fatigue    Skin: No rashes, itching, or dryness. No change in size/color of moles. No tumors, brittle nails, pitted nails, or hair loss    Breast: No tenderness, lumps, or nipple discharge      Ophthalmologic: poor vision and astigmatism. No diplopia, photophobia, lacrimation, blurred Vision , or eye discharge    ENMT Symptoms: No hearing difficulty, ear pain, tinnitus, or vertigo. No sinus symptoms, nasal congestion, nasal   discharge, or nasal obstruction    Respiratory and Thorax: No wheezing, dyspnea, cough, hemoptysis, or pleuritic chest pain     Cardiovascular: No chest pain, palpitations, dyspnea on exertion, orthopnea, paroxysmal nocturnal dyspnea,   peripheral edema, or claudication    Gastrointestinal: No nausea, vomiting, diarrhea, constipation, change in bowel habits, flatulence, abdominal pain, or melena    Genitourinary/ Pelvis: No hematuria, renal colic, or flank pain.  No urine discoloration, incontinence, dysuria, or urinary hesitancy. Normal urinary frequency. No nocturia, abnormal vaginal bleeding, vaginal discharge, spotting, pelvic pain, or vaginal leakage    Musculoskeletal: No arthralgia, arthritis, joint swelling, muscle cramping, muscle weakness, neck pain, arm pain, or leg pain    Neurological: No transient paralysis, weakness, paresthesias, or seizures. No syncope, tremors, vertigo, loss of sensation, difficulty walking, loss of consciousness, hemiparesis, confusion, or facial palsy    Psychiatric: No suicidal ideation, depression, anxiety, insomnia, memory loss, paranoia, mood swings, agitation, hallucinations, or hyperactivity    Hematology: No gum bleeding, nose bleeding, or skin lumps    Lymphatic: No enlarged or tender lymph nodes. No extremity swelling    Endocrine: No heat or cold intolerance    Immunologic: No recurrent or persistent infections

## 2024-09-24 LAB
BASOPHILS # BLD AUTO: 0.01 K/UL — SIGNIFICANT CHANGE UP (ref 0–0.2)
BASOPHILS NFR BLD AUTO: 0.1 % — SIGNIFICANT CHANGE UP (ref 0–2)
EOSINOPHIL # BLD AUTO: 0.03 K/UL — SIGNIFICANT CHANGE UP (ref 0–0.5)
EOSINOPHIL NFR BLD AUTO: 0.4 % — SIGNIFICANT CHANGE UP (ref 0–6)
HCT VFR BLD CALC: 35.5 % — SIGNIFICANT CHANGE UP (ref 34.5–45)
HGB BLD-MCNC: 11.5 G/DL — SIGNIFICANT CHANGE UP (ref 11.5–15.5)
IMM GRANULOCYTES NFR BLD AUTO: 0.1 % — SIGNIFICANT CHANGE UP (ref 0–0.9)
LYMPHOCYTES # BLD AUTO: 2 K/UL — SIGNIFICANT CHANGE UP (ref 1–3.3)
LYMPHOCYTES # BLD AUTO: 24.9 % — SIGNIFICANT CHANGE UP (ref 13–44)
MCHC RBC-ENTMCNC: 29 PG — SIGNIFICANT CHANGE UP (ref 27–34)
MCHC RBC-ENTMCNC: 32.4 GM/DL — SIGNIFICANT CHANGE UP (ref 32–36)
MCV RBC AUTO: 89.6 FL — SIGNIFICANT CHANGE UP (ref 80–100)
MONOCYTES # BLD AUTO: 0.47 K/UL — SIGNIFICANT CHANGE UP (ref 0–0.9)
MONOCYTES NFR BLD AUTO: 5.8 % — SIGNIFICANT CHANGE UP (ref 2–14)
NEUTROPHILS # BLD AUTO: 5.52 K/UL — SIGNIFICANT CHANGE UP (ref 1.8–7.4)
NEUTROPHILS NFR BLD AUTO: 68.7 % — SIGNIFICANT CHANGE UP (ref 43–77)
PLATELET # BLD AUTO: 316 K/UL — SIGNIFICANT CHANGE UP (ref 150–400)
RBC # BLD: 3.96 M/UL — SIGNIFICANT CHANGE UP (ref 3.8–5.2)
RBC # FLD: 14.6 % — HIGH (ref 10.3–14.5)
WBC # BLD: 8.04 K/UL — SIGNIFICANT CHANGE UP (ref 3.8–10.5)
WBC # FLD AUTO: 8.04 K/UL — SIGNIFICANT CHANGE UP (ref 3.8–10.5)

## 2024-09-27 ENCOUNTER — APPOINTMENT (OUTPATIENT)
Dept: OBGYN | Facility: CLINIC | Age: 24
End: 2024-09-27
Payer: MEDICAID

## 2024-09-27 VITALS — WEIGHT: 181 LBS | DIASTOLIC BLOOD PRESSURE: 67 MMHG | SYSTOLIC BLOOD PRESSURE: 103 MMHG

## 2024-09-27 DIAGNOSIS — Z34.80 ENCOUNTER FOR SUPERVISION OF OTHER NORMAL PREGNANCY, UNSPECIFIED TRIMESTER: ICD-10-CM

## 2024-09-27 DIAGNOSIS — Z3A.38 38 WEEKS GESTATION OF PREGNANCY: ICD-10-CM

## 2024-09-27 PROCEDURE — 0502F SUBSEQUENT PRENATAL CARE: CPT

## 2024-09-29 ENCOUNTER — TRANSCRIPTION ENCOUNTER (OUTPATIENT)
Age: 24
End: 2024-09-29

## 2024-09-30 ENCOUNTER — INPATIENT (INPATIENT)
Facility: HOSPITAL | Age: 24
LOS: 2 days | Discharge: HOME | End: 2024-10-03
Attending: OBSTETRICS & GYNECOLOGY | Admitting: OBSTETRICS & GYNECOLOGY
Payer: MEDICAID

## 2024-09-30 ENCOUNTER — TRANSCRIPTION ENCOUNTER (OUTPATIENT)
Age: 24
End: 2024-09-30

## 2024-09-30 VITALS
DIASTOLIC BLOOD PRESSURE: 67 MMHG | SYSTOLIC BLOOD PRESSURE: 114 MMHG | RESPIRATION RATE: 17 BRPM | HEIGHT: 64 IN | HEART RATE: 76 BPM | TEMPERATURE: 98 F | WEIGHT: 182.1 LBS

## 2024-09-30 DIAGNOSIS — Z98.891 HISTORY OF UTERINE SCAR FROM PREVIOUS SURGERY: Chronic | ICD-10-CM

## 2024-09-30 DIAGNOSIS — Z98.891 HISTORY OF UTERINE SCAR FROM PREVIOUS SURGERY: ICD-10-CM

## 2024-09-30 DIAGNOSIS — O34.211 MATERNAL CARE FOR LOW TRANSVERSE SCAR FROM PREVIOUS CESAREAN DELIVERY: ICD-10-CM

## 2024-09-30 LAB
BASOPHILS # BLD AUTO: 0.01 K/UL — SIGNIFICANT CHANGE UP (ref 0–0.2)
BASOPHILS NFR BLD AUTO: 0.1 % — SIGNIFICANT CHANGE UP (ref 0–2)
BLD GP AB SCN SERPL QL: NEGATIVE — SIGNIFICANT CHANGE UP
EOSINOPHIL # BLD AUTO: 0.03 K/UL — SIGNIFICANT CHANGE UP (ref 0–0.5)
EOSINOPHIL NFR BLD AUTO: 0.4 % — SIGNIFICANT CHANGE UP (ref 0–6)
HCT VFR BLD CALC: 35.9 % — SIGNIFICANT CHANGE UP (ref 34.5–45)
HCT VFR BLD CALC: 37.1 % — SIGNIFICANT CHANGE UP (ref 34.5–45)
HGB BLD-MCNC: 12 G/DL — SIGNIFICANT CHANGE UP (ref 11.5–15.5)
HGB BLD-MCNC: 12.5 G/DL — SIGNIFICANT CHANGE UP (ref 11.5–15.5)
IANC: 4.82 K/UL — SIGNIFICANT CHANGE UP (ref 1.8–7.4)
IMM GRANULOCYTES NFR BLD AUTO: 0.3 % — SIGNIFICANT CHANGE UP (ref 0–0.9)
LYMPHOCYTES # BLD AUTO: 1.97 K/UL — SIGNIFICANT CHANGE UP (ref 1–3.3)
LYMPHOCYTES # BLD AUTO: 27 % — SIGNIFICANT CHANGE UP (ref 13–44)
MCHC RBC-ENTMCNC: 29.9 PG — SIGNIFICANT CHANGE UP (ref 27–34)
MCHC RBC-ENTMCNC: 30.1 PG — SIGNIFICANT CHANGE UP (ref 27–34)
MCHC RBC-ENTMCNC: 33.4 GM/DL — SIGNIFICANT CHANGE UP (ref 32–36)
MCHC RBC-ENTMCNC: 33.7 GM/DL — SIGNIFICANT CHANGE UP (ref 32–36)
MCV RBC AUTO: 89.3 FL — SIGNIFICANT CHANGE UP (ref 80–100)
MCV RBC AUTO: 89.4 FL — SIGNIFICANT CHANGE UP (ref 80–100)
MONOCYTES # BLD AUTO: 0.45 K/UL — SIGNIFICANT CHANGE UP (ref 0–0.9)
MONOCYTES NFR BLD AUTO: 6.2 % — SIGNIFICANT CHANGE UP (ref 2–14)
NEUTROPHILS # BLD AUTO: 4.82 K/UL — SIGNIFICANT CHANGE UP (ref 1.8–7.4)
NEUTROPHILS NFR BLD AUTO: 66 % — SIGNIFICANT CHANGE UP (ref 43–77)
NRBC # BLD: 0 /100 WBCS — SIGNIFICANT CHANGE UP (ref 0–0)
NRBC # BLD: 0 /100 WBCS — SIGNIFICANT CHANGE UP (ref 0–0)
NRBC # FLD: 0 K/UL — SIGNIFICANT CHANGE UP (ref 0–0)
NRBC # FLD: 0 K/UL — SIGNIFICANT CHANGE UP (ref 0–0)
PLATELET # BLD AUTO: 263 K/UL — SIGNIFICANT CHANGE UP (ref 150–400)
PLATELET # BLD AUTO: 286 K/UL — SIGNIFICANT CHANGE UP (ref 150–400)
RBC # BLD: 4.02 M/UL — SIGNIFICANT CHANGE UP (ref 3.8–5.2)
RBC # BLD: 4.15 M/UL — SIGNIFICANT CHANGE UP (ref 3.8–5.2)
RBC # FLD: 14 % — SIGNIFICANT CHANGE UP (ref 10.3–14.5)
RBC # FLD: 14.3 % — SIGNIFICANT CHANGE UP (ref 10.3–14.5)
RH IG SCN BLD-IMP: POSITIVE — SIGNIFICANT CHANGE UP
WBC # BLD: 19.29 K/UL — HIGH (ref 3.8–10.5)
WBC # BLD: 7.3 K/UL — SIGNIFICANT CHANGE UP (ref 3.8–10.5)
WBC # FLD AUTO: 19.29 K/UL — HIGH (ref 3.8–10.5)
WBC # FLD AUTO: 7.3 K/UL — SIGNIFICANT CHANGE UP (ref 3.8–10.5)

## 2024-09-30 PROCEDURE — 59510 CESAREAN DELIVERY: CPT | Mod: U9

## 2024-09-30 RX ORDER — OXYCODONE HYDROCHLORIDE 30 MG/1
5 TABLET, FILM COATED, EXTENDED RELEASE ORAL
Refills: 0 | Status: DISCONTINUED | OUTPATIENT
Start: 2024-09-30 | End: 2024-09-30

## 2024-09-30 RX ORDER — DIPHENHYDRAMINE HCL 12.5MG/5ML
25 LIQUID (ML) ORAL EVERY 6 HOURS
Refills: 0 | Status: DISCONTINUED | OUTPATIENT
Start: 2024-09-30 | End: 2024-10-03

## 2024-09-30 RX ORDER — SODIUM CITRATE AND CITRIC ACID MONOHYDRATE 334; 500 MG/5ML; MG/5ML
30 SOLUTION ORAL ONCE
Refills: 0 | Status: COMPLETED | OUTPATIENT
Start: 2024-09-30 | End: 2024-09-30

## 2024-09-30 RX ORDER — FERROUS SULFATE 325(65) MG
325 TABLET ORAL DAILY
Refills: 0 | Status: DISCONTINUED | OUTPATIENT
Start: 2024-09-30 | End: 2024-10-03

## 2024-09-30 RX ORDER — NALBUPHINE HYDROCHLORIDE 10 MG/ML
2.5 INJECTION, SOLUTION INTRAMUSCULAR; INTRAVENOUS; SUBCUTANEOUS EVERY 6 HOURS
Refills: 0 | Status: DISCONTINUED | OUTPATIENT
Start: 2024-09-30 | End: 2024-10-01

## 2024-09-30 RX ORDER — FAMOTIDINE 40 MG
20 TABLET ORAL ONCE
Refills: 0 | Status: COMPLETED | OUTPATIENT
Start: 2024-09-30 | End: 2024-09-30

## 2024-09-30 RX ORDER — OXYCODONE HYDROCHLORIDE 30 MG/1
10 TABLET, FILM COATED, EXTENDED RELEASE ORAL
Refills: 0 | Status: DISCONTINUED | OUTPATIENT
Start: 2024-09-30 | End: 2024-09-30

## 2024-09-30 RX ORDER — NALOXONE HYDROCHLORIDE 0.4 MG/ML
0.1 INJECTION, SOLUTION INTRAMUSCULAR; INTRAVENOUS; SUBCUTANEOUS
Refills: 0 | Status: DISCONTINUED | OUTPATIENT
Start: 2024-09-30 | End: 2024-10-01

## 2024-09-30 RX ORDER — OXYTOCIN/RINGER'S LACTATE 20/500ML
42 PLASTIC BAG, INJECTION (ML) INTRAVENOUS
Qty: 30 | Refills: 0 | Status: DISCONTINUED | OUTPATIENT
Start: 2024-09-30 | End: 2024-10-01

## 2024-09-30 RX ORDER — ACETAMINOPHEN 325 MG
975 TABLET ORAL EVERY 6 HOURS
Refills: 0 | Status: DISCONTINUED | OUTPATIENT
Start: 2024-09-30 | End: 2024-10-03

## 2024-09-30 RX ORDER — ONDANSETRON HCL/PF 4 MG/2 ML
4 VIAL (ML) INJECTION EVERY 6 HOURS
Refills: 0 | Status: DISCONTINUED | OUTPATIENT
Start: 2024-09-30 | End: 2024-09-30

## 2024-09-30 RX ORDER — OXYTOCIN/RINGER'S LACTATE 20/500ML
167 PLASTIC BAG, INJECTION (ML) INTRAVENOUS
Qty: 30 | Refills: 0 | Status: DISCONTINUED | OUTPATIENT
Start: 2024-09-30 | End: 2024-09-30

## 2024-09-30 RX ORDER — SODIUM CHLORIDE IRRIG SOLUTION 0.9 %
1000 SOLUTION, IRRIGATION IRRIGATION
Refills: 0 | Status: DISCONTINUED | OUTPATIENT
Start: 2024-09-30 | End: 2024-09-30

## 2024-09-30 RX ORDER — CHLORHEXIDINE GLUCONATE ORAL RINSE 1.2 MG/ML
1 SOLUTION DENTAL DAILY
Refills: 0 | Status: DISCONTINUED | OUTPATIENT
Start: 2024-09-30 | End: 2024-09-30

## 2024-09-30 RX ORDER — SODIUM CHLORIDE IRRIG SOLUTION 0.9 %
500 SOLUTION, IRRIGATION IRRIGATION ONCE
Refills: 0 | Status: DISCONTINUED | OUTPATIENT
Start: 2024-10-01 | End: 2024-10-02

## 2024-09-30 RX ORDER — MAGNESIUM HYDROXIDE 400 MG/5ML
30 SUSPENSION, ORAL (FINAL DOSE FORM) ORAL
Refills: 0 | Status: DISCONTINUED | OUTPATIENT
Start: 2024-09-30 | End: 2024-10-03

## 2024-09-30 RX ORDER — MORPHINE SULFATE 30 MG/1
0.1 TABLET, FILM COATED, EXTENDED RELEASE ORAL ONCE
Refills: 0 | Status: DISCONTINUED | OUTPATIENT
Start: 2024-09-30 | End: 2024-10-01

## 2024-09-30 RX ORDER — ONDANSETRON HCL/PF 4 MG/2 ML
4 VIAL (ML) INJECTION EVERY 6 HOURS
Refills: 0 | Status: DISCONTINUED | OUTPATIENT
Start: 2024-09-30 | End: 2024-10-01

## 2024-09-30 RX ORDER — KETOROLAC TROMETHAMINE 10 MG/1
30 TABLET, FILM COATED ORAL EVERY 6 HOURS
Refills: 0 | Status: DISCONTINUED | OUTPATIENT
Start: 2024-09-30 | End: 2024-10-01

## 2024-09-30 RX ORDER — PRENATAL VIT,CAL 76/IRON/FOLIC 29 MG-1 MG
1 TABLET ORAL DAILY
Refills: 0 | Status: DISCONTINUED | OUTPATIENT
Start: 2024-09-30 | End: 2024-10-03

## 2024-09-30 RX ORDER — SODIUM CHLORIDE IRRIG SOLUTION 0.9 %
1000 SOLUTION, IRRIGATION IRRIGATION
Refills: 0 | Status: DISCONTINUED | OUTPATIENT
Start: 2024-09-30 | End: 2024-10-01

## 2024-09-30 RX ORDER — OXYCODONE HYDROCHLORIDE 30 MG/1
5 TABLET, FILM COATED, EXTENDED RELEASE ORAL
Refills: 0 | Status: DISCONTINUED | OUTPATIENT
Start: 2024-10-01 | End: 2024-10-03

## 2024-09-30 RX ORDER — TETANUS TOXOID, REDUCED DIPHTHERIA TOXOID AND ACELLULAR PERTUSSIS VACCINE, ADSORBED 5; 2.5; 8; 8; 2.5 [IU]/.5ML; [IU]/.5ML; UG/.5ML; UG/.5ML; UG/.5ML
0.5 SUSPENSION INTRAMUSCULAR ONCE
Refills: 0 | Status: DISCONTINUED | OUTPATIENT
Start: 2024-10-01 | End: 2024-10-03

## 2024-09-30 RX ORDER — ACETAMINOPHEN 325 MG
975 TABLET ORAL
Refills: 0 | Status: DISCONTINUED | OUTPATIENT
Start: 2024-10-01 | End: 2024-10-03

## 2024-09-30 RX ORDER — OXYCODONE HYDROCHLORIDE 30 MG/1
5 TABLET, FILM COATED, EXTENDED RELEASE ORAL ONCE
Refills: 0 | Status: DISCONTINUED | OUTPATIENT
Start: 2024-10-01 | End: 2024-10-03

## 2024-09-30 RX ORDER — SENNOSIDES 8.6 MG
2 TABLET ORAL AT BEDTIME
Refills: 0 | Status: DISCONTINUED | OUTPATIENT
Start: 2024-09-30 | End: 2024-10-03

## 2024-09-30 RX ADMIN — SODIUM CITRATE AND CITRIC ACID MONOHYDRATE 30 MILLILITER(S): 334; 500 SOLUTION ORAL at 07:26

## 2024-09-30 RX ADMIN — Medication 975 MILLIGRAM(S): at 21:00

## 2024-09-30 RX ADMIN — Medication 2 TABLET(S): at 21:58

## 2024-09-30 RX ADMIN — KETOROLAC TROMETHAMINE 30 MILLIGRAM(S): 10 TABLET, FILM COATED ORAL at 18:00

## 2024-09-30 RX ADMIN — CHLORHEXIDINE GLUCONATE ORAL RINSE 1 APPLICATION(S): 1.2 SOLUTION DENTAL at 06:40

## 2024-09-30 RX ADMIN — KETOROLAC TROMETHAMINE 30 MILLIGRAM(S): 10 TABLET, FILM COATED ORAL at 17:36

## 2024-09-30 RX ADMIN — Medication 200 MILLILITER(S): at 07:26

## 2024-09-30 RX ADMIN — Medication 20 MILLIGRAM(S): at 07:26

## 2024-09-30 RX ADMIN — Medication 975 MILLIGRAM(S): at 22:00

## 2024-09-30 RX ADMIN — Medication 5000 UNIT(S): at 17:36

## 2024-09-30 NOTE — PROCEDURAL SAFETY CHECKLIST WITH OR WITHOUT SEDATION - NSPREIMAGEREVIEW_GEN_ALL_CORE
What Type Of Note Output Would You Prefer (Optional)?: Bullet Format How Severe Is Your Skin Lesion?: moderate Has Your Skin Lesion Been Treated?: not been treated Is This A New Presentation, Or A Follow-Up?: Growth not applicable

## 2024-09-30 NOTE — OB PROVIDER H&P - NS_OBGYNHISTORY_OBGYN_ALL_OB_FT
2021,  section, "eye pressure," as per chart for preeclampsia, Full term, 7#    Gyn Hx: Denies fibroids, ovarian cysts, abnormal pap smears

## 2024-09-30 NOTE — OB PROVIDER H&P - NSHPSOCIALHISTORY_GEN_ALL_CORE
Lives at home with , children and relatives, feels safe. Denies alcohol/tobacco/drug use during pregnancy. Denies history of Gonorrhea, Chlamydia, HIV, HSV, STIs.    Psych: Denies PPD, depression, anxiety

## 2024-09-30 NOTE — OB PROVIDER H&P - HISTORY OF PRESENT ILLNESS
25yo female  SAM 10/4/24 presents @39.3 weeks for scheduled rLTCS. Denies shortness of breath, fever, chills or cough.  Denies vaginal bleeding, leakage of fluids, or contractions today. Reports positive fetal movement.    Primary OB: Dr. Tomas  Antepartum Course: anatomy scan wnl, GCT wnl, GBS negative 24, NIPT low risk, Cystic fibrosis carrier, FOB not tested as per pt   Prenatal labs:  -T&S: O+  -Rubella: Immune  -Hep B: Nonreactive  -Hep C: Nonreactive  -HIV: Nonreactive  -RPR: Negative  Ultrasounds: Last MFM sonogram EFW 3217g (24)

## 2024-09-30 NOTE — OB RN PATIENT PROFILE - FUNCTIONAL ASSESSMENT - BASIC MOBILITY 6.
4-calculated by average/Not able to assess (calculate score using [facility] averaging method)

## 2024-09-30 NOTE — OB RN PATIENT PROFILE - NS PRO PT RIGHT SUPPORT PERSON
Clofazimine Counseling:  I discussed with the patient the risks of clofazimine including but not limited to skin and eye pigmentation, liver damage, nausea/vomiting, gastrointestinal bleeding and allergy. Yes

## 2024-09-30 NOTE — OB RN PATIENT PROFILE - FALL HARM RISK - UNIVERSAL INTERVENTIONS
Bed: G15  Expected date: 9/14/18  Expected time: 4:54 PM  Means of arrival: [facility] (406)  Comments:  77M.  Biliary drain issue.  From [facility].   A&Ox4.  141/68, 74, 16, 96%RA.  BG 98 w/out dinner.  Hx of DM.  
Family at bedside.  
Bed in lowest position, wheels locked, appropriate side rails in place/Call bell, personal items and telephone in reach/Instruct patient to call for assistance before getting out of bed or chair/Non-slip footwear when patient is out of bed/[place] to call system/Physically safe environment - no spills, clutter or unnecessary equipment/Purposeful Proactive Rounding/Room/bathroom lighting operational, light cord in reach

## 2024-09-30 NOTE — DISCHARGE NOTE OB - PATIENT PORTAL LINK FT
You can access the FollowMyHealth Patient Portal offered by [facility] by registering at the following website: [email or web address]. By joining [employer]’s FollowMyHealth portal, you will also be able to view your health information using other applications (apps) compatible with our system.

## 2024-09-30 NOTE — OB PROVIDER H&P - ASSESSMENT
25yo  @ 39.3w  Dx: Scheduled rLTCS  Cystic fibrosis carrier, FOB not tested    - Admit to L&D  - NPO  - EFM/TOCO  - IV access, CBC/T&C/RPR  - Pepcid and Bicitra as per pre-op policy  - Anesthesia consult   - Blood transfusion consent  - Operative Consent to be discussed and obtained by Dr. Goldberg  - Plan to move to OR on time schedule    Risks, benefits, alternative, and possible complications have been discussed in detail by Dr. Goldberg with the patient in her native language, Pre-admission, admission, post admission procedures and expectations were discussed in detail. All questions answered, all appropriate hospital consents were signed.     - Discussed with Dr. Goldberg Talya Shoshani, PAC   25yo  @ 39.3w  Dx: Scheduled rLTCS  Cystic fibrosis carrier, FOB not tested    - Admit to L&D  - NPO  - EFM/TOCO  - IV access, CBC/T&C/RPR  - Pepcid and Bicitra as per pre-op policy  - Anesthesia consult   - Blood transfusion consent  - Operative Consent to be discussed and obtained by Dr. Goldberg  - Plan to move to OR on time schedule    Risks, benefits, alternative, and possible complications have been discussed in detail by Dr. Goldberg with the patient in her native language, Pre-admission, admission, post admission procedures and expectations were discussed in detail. All questions answered, all appropriate hospital consents were signed.         - Discussed with Dr. Goldberg Talya Shoshani, PAC

## 2024-09-30 NOTE — OB PROVIDER DELIVERY SUMMARY - NSPROVIDERDELIVERYNOTE_OBGYN_ALL_OB_FT
Grossly normal uterus, bilateral fallopian tubes & ovaries. Small 0.5cm fibroid right anterior uterine body.  Liveborn male infant, weight 3370g, Apgars 9/9.  Cephalic presentation, clear copious fluid.  Hysterotomy closed in single layer with 0-0 Vicryl suture. Left angle oversewn with same suture.   Posterior hysterotomy extension incorporated into hysterotomy closure.   Fascia layer closed with 0-0 Vicryl suture.   Subcutaneous tissue reapproximated with 2-0 Plain Gut suture.  Skin closed in subcuticular fashion with 4-0 Monocryl suture.  Kenalog injected superficially along closure.    QBL: 729  IVF: 1800  UOP: 1000  Dictation Number:     Maryam Contreras, PGY2  Delivery with Dr. Goldberg Grossly normal uterus, bilateral fallopian tubes & ovaries. Small 0.5cm fibroid right anterior uterine body.  Liveborn male infant, weight 3370g, Apgars 9/9.  Cephalic presentation, clear fluid.  Keloid scar removed at time of skin incision.  Hysterotomy closed in single layer with 0-0 Vicryl suture. Left angle oversewn with same suture.   Inferior hysterotomy extension incorporated into hysterotomy closure.   Fascia layer closed with 0-0 Vicryl suture.   Subcutaneous tissue reapproximated with 2-0 Plain Gut suture.  Skin closed in subcuticular fashion with 4-0 Monocryl suture.  Kenalog injected superficially along closure.    QBL: 729  IVF: 1800  UOP: 1000  Dictation Number:     Maryam Contreras, PGY2  Delivery with Dr. Goldberg Grossly normal uterus, bilateral fallopian tubes & ovaries. Small 0.5cm fibroid right anterior uterine body.  Liveborn male infant, weight 3370g, Apgars 9/9.  Cephalic presentation, clear fluid.  Keloid scar removed at time of skin incision.  Hysterotomy closed in single layer with 0-0 Vicryl suture. Left angle oversewn with same suture.   Inferior hysterotomy extension incorporated into hysterotomy closure.   TXA and methergine given for bleeding.  Fascia layer closed with 0-0 Vicryl suture.   Subcutaneous tissue reapproximated with 2-0 Plain Gut suture.  Skin closed in subcuticular fashion with 4-0 Monocryl suture.  Kenalog injected superficially along closure.    QBL: 729  IVF: 1800  UOP: 1000  Dictation Number:     Maryam Contreras, PGY2  Delivery with Dr. Goldberg Grossly normal uterus, bilateral fallopian tubes & ovaries. Small 0.5cm fibroid right anterior uterine body.  Liveborn male infant, weight 3370g, Apgars 9/9.  Cephalic presentation, clear fluid.  Keloid scar removed at time of skin incision and kenalog injected after skin closure.  Hysterotomy closed in single layer with 0-0 Vicryl suture. Left angle oversewn with same suture.   Inferior hysterotomy extension incorporated into hysterotomy closure.   TXA and methergine given for bleeding.  Fascia layer closed with 0-0 Vicryl suture.   Subcutaneous tissue reapproximated with 2-0 Plain Gut suture.  Skin closed in subcuticular fashion with 4-0 Monocryl suture.  Kenalog injected superficially along closure.    QBL: 729  IVF: 1800  UOP: 1000  Dictation Number:     Maryam Contreras, PGY2  Delivery with Dr. Goldberg Grossly normal uterus, bilateral fallopian tubes & ovaries. Small 0.5cm fibroid right anterior uterine body.  Liveborn male infant, weight 3370g, Apgars 9/9.  Cephalic presentation, clear fluid.  Keloid scar removed at time of skin incision and kenalog injected after skin closure.  Hysterotomy closed in single layer with 0-0 Vicryl suture. Left angle oversewn with same suture.   Inferior hysterotomy extension incorporated into hysterotomy closure.   TXA and methergine given for bleeding.  Fascia layer closed with 0-0 Vicryl suture.   Subcutaneous tissue reapproximated with 2-0 Plain Gut suture.  Skin closed in subcuticular fashion with 4-0 Monocryl suture.  Kenalog injected superficially along closure.    QBL: 729  IVF: 1800  UOP: 1000  Dictation Number: [id number]    Maryam Contreras, PGY2  Delivery with Dr. Goldberg

## 2024-09-30 NOTE — OB PROVIDER H&P - NS ATTEND AMEND GEN_ALL_CORE FT
25yo  @ 39.3 here for schedule RLTCS  - Routine preop meds and labs  - Continuous EFM, cat 1  - Counseled pt on risks of RCS including bleeding, infection, injury to surrounding structures, transfusion, hysterectomy. All questions answered and consent signed  - [place]  [place] ID [id number] used for entire encounter    J Goldberg MD

## 2024-09-30 NOTE — DISCHARGE NOTE OB - PLAN OF CARE
Routine PP/postop care Follow up in the office in 2 weeks for an incision check and in 6 weeks for a postpartum visit.   Regular diet. Resume normal activity as tolerated. No heavy lifting, driving, or strenuous activity for 2 weeks. Nothing per vagina such as tampons, intercourse, douches or tub baths for 6 weeks or until you see your doctor. Call your doctor with any signs and symptoms of infection such as fever, chills, nausea or vomiting. Call your doctor with redness or swelling at the incision site, fluid leakage or wound separation. Call your doctor if you're unable to tolerate food, increase in vaginal bleeding or have difficulty urinating. Call your doctor if you have pain that is not relieved by your prescribed medications. Notify your doctor with any other concerns.

## 2024-09-30 NOTE — OB RN DELIVERY SUMMARY - NSSELHIDDEN_OBGYN_ALL_OB_FT
[NS_DeliveryAttending1_OBGYN_ALL_OB_FT:[id number]],[NS_DeliveryAssist1_OBGYN_ALL_OB_FT:[id number]=]

## 2024-09-30 NOTE — OB RN INTRAOPERATIVE NOTE - NSSELHIDDEN_OBGYN_ALL_OB_FT
[NS_DeliveryAttending1_OBGYN_ALL_OB_FT:[id number]],[NS_DeliveryAssist1_OBGYN_ALL_OB_FT:[id number]=]

## 2024-09-30 NOTE — OB PROVIDER DELIVERY SUMMARY - NSLOWPPHRISK_OBGYN_A_OB
[place] Pregnancy/Less than or equal to 4 previous vaginal births/No known bleeding disorder/No history of postpartum hemorrhage/No other PPH risks indicated

## 2024-09-30 NOTE — PROCEDURAL SAFETY CHECKLIST WITH OR WITHOUT SEDATION - NSPREVERIFYSED_GEN_ALL_CORE
[facility] [place] [address] [place] [address] [place] [address]  Phone: [phone]    Amber Gooden was seen and treated in our emergency department on 5/28/2024.  She may return to work on 05/31/2024.         Thank you for choosing [facility].    Rene Vazquez Jr., APRN       done

## 2024-09-30 NOTE — DISCHARGE NOTE OB - CARE PROVIDER_API CALL
Shade Tomas  Maternal/Fetal Medicine  [address], [address]  [place] [address]  Phone: [phone]  Fax: [phone]  Follow Up Time:

## 2024-09-30 NOTE — OB PROVIDER DELIVERY SUMMARY - NSSELHIDDEN_OBGYN_ALL_OB_FT
[NS_DeliveryAttending1_OBGYN_ALL_OB_FT:[id number]],[NS_DeliveryAssist1_OBGYN_ALL_OB_FT:[id number]=]

## 2024-09-30 NOTE — OB RN PREOPERATIVE CHECKLIST - NS_PREOPMEDADMIN_OBGYN_ALL_OB
----- Message from Carrie Morton sent at 1/28/2020  2:32 PM CST -----  Contact: Grand mom [phone]  Needs Advice    Reason for call:Grand mom want to get Pt a flu shot         Communication Preference: Grand Mom states she do not know where she can take him  Additional Information:Grandmom need help w/ getting a flu for Pt?       Yes, see eMAR

## 2024-09-30 NOTE — DISCHARGE NOTE OB - ADDITIONAL INSTRUCTIONS
Follow up in 2 weeks for incision check and again in 6 weeks for routine visit Follow up in 2 weeks for incision check and again in 6 weeks for routine visit.

## 2024-09-30 NOTE — OB PROVIDER H&P - NSHPPHYSICALEXAM_GEN_ALL_CORE
ICU Vital Signs Last 24 Hrs  T(C): 36.8 (30 Sep 2024 06:55), Max: 36.8 (30 Sep 2024 06:55)  T(F): 98.2 (30 Sep 2024 06:55), Max: 98.2 (30 Sep 2024 06:55)  HR: 76 (30 Sep 2024 07:07) (76 - 76)  BP: 114/67 (30 Sep 2024 07:07) (114/67 - 114/67)  BP(mean): --  ABP: --  ABP(mean): --  RR: 17 (30 Sep 2024 06:55) (17 - 17)  SpO2: --    O2 Parameters below as of 30 Sep 2024 06:55  Patient On (Oxygen Delivery Method): room air    General: A&O x3  Neuro: symmetrical facial features, no slurring of words  Lungs: breathing is unlabored  Extremities: full range of motion x4  Abdomen: Soft, Gravid, TOCO in place, +pfannenstiel scar     EFM: baseline , moderate variability, +accels, -decels, Category 1, reactive  TOCO: contractions noted, irregular    Limited Bedside Ultrasound: cephalic presentation, posterior placenta, Fetal HR movement seen    Neuro: grossly intact

## 2024-09-30 NOTE — OB RN PATIENT PROFILE - FALL HARM RISK - PT AGE POPULATION HIDDEN
Neurology Consult Note  Query: TIA    Impression and Recommendations:    This is a 77 year old male, who presented to the ER on 4/15/22 after family urged him to seek care after an episode of slurred speech and left arm numbness. His INR was subtherapeutic in the ER. CTA head/neck showed no focal stenosis. I advised ER staff to consult Cardiology to start and manage bridging anticoagulation.  Has history of mechanical heart valve on warfarin    Initial neuro exam relevant findings: alert, oriented, fluent, no focal deficits in bed.    ---------------------------------------------------------------------------------    > TIA, suspect cardioembolism.   - management of anticoagulation per team      Dispo: No further neurological workup is indicated. We will sign off, please call with any further questions.      Above and plan were discussed with patient and Dr. Goncalves this AM.        Thank you for involving us in your patient's care.     ------------------------------------------------------------------------------------------    Luis Carlos Gonzales MD  4/16/2022    2:59 PM  General Neurology, Neurocritical Care & Stroke  [facility]  Office: [phone]    Medications Prior to Admission   Medication Sig Dispense Refill   • Barberry-Oreg Grape-Goldenseal (BERBERINE COMPLEX PO)      • warfarin (COUMADIN) 5 MG tablet TAKE 1 TABLET EVERY DAY (Patient taking differently: Take 10 mg by mouth 1 day a week. 10 mg on Tues and 7.5 mg all other days of the week.) 90 tablet 1   • benazepril (LOTENSIN) 20 MG tablet TAKE 1 TABLET EVERY DAY 90 tablet 1   • Moringa Oleifera (MORINGA PO) Take 2 capsules by mouth 2 times daily.     • warfarin (COUMADIN) 7.5 MG tablet TAKE 1 TABLET EVERY DAY (Patient taking differently: Take 7.5 mg by mouth 6 days a week. 10 mg on Tues and 7.5 mg rest of week) 90 tablet 1   • metoPROLOL succinate (TOPROL-XL) 50 MG 24 hr tablet TAKE 1 TABLET EVERY DAY (Patient taking differently: Take 50 mg by mouth  daily. ) 90 tablet 1   • aspirin (ECOTRIN) 81 MG EC tablet Take 81 mg by mouth daily.     • brimonidine-timolol 0.2-0.5 % ophthalmic solution Place 1 drop into both eyes 2 times daily.     • folic acid (FOLATE) 1 MG tablet Take 1 mg by mouth daily.      • meclizine (ANTIVERT) 25 MG tablet Take 25 mg by mouth daily as needed.          Scheduled Meds:  • warfarin  10 mg Oral Once   • sodium chloride (PF)  2 mL Intracatheter 2 times per day   • aspirin  81 mg Oral Daily   • metoPROLOL succinate  50 mg Oral Daily   • brimonidine  1 drop Both Eyes BID   • timolol  1 drop Both Eyes BID   • WARFARIN - PHARMACIST MONITORED   Does not apply See Admin Instructions   • enoxaparin  1 mg/kg Subcutaneous Q12H   • Potassium Standard Replacement Protocol   Does not apply See Admin Instructions   • Magnesium Standard Replacement Protocol   Does not apply See Admin Instructions   • pneumococcal 23-valent vaccine  0.5 mL Intramuscular Once     Continuous Infusions:  PRN Meds:.sodium chloride, sodium chloride, ondansetron, acetaminophen, HYDROcodone-acetaminophen, polyethylene glycol, aluminum-magnesium hydroxide-simethicone, zolpidem                HPI     See above.      Past Medical History:   Diagnosis Date   • Essential (primary) hypertension    • Fracture    • Hiatal hernia    • High cholesterol    • Mitral valve stenosis 10/24/2019    Mild stenosis of the prostatic mitral valve was noted on the echocardiogram from 2014, unchanged in 2017 and 2020 Normal prosthetic function   • Syncope        Past Surgical History:   Procedure Laterality Date   • Adrenal gland surgery     • Cardiac surgery     • Cataract extraction, bilateral  2019   • Hernia repair     • Mitral valve replacement         ALLERGIES:   Allergen Reactions   • Morphine Other (See Comments)     Unknown     Social History     Tobacco Use   • Smoking status: Never Smoker   • Smokeless tobacco: Never Used   Vaping Use   • Vaping Use: never used   Substance Use Topics   •  Alcohol use: Yes     Alcohol/week: 1.0 standard drink     Types: 1 Shots of liquor per week   • Drug use: Not Currently     History reviewed. No pertinent family history.    Review of Systems    10 systems were reviewed, rest is per HPI.      Physical Exam   Temp:  [97.2 °F (36.2 °C)-98.1 °F (36.7 °C)] 97.2 °F (36.2 °C)  Heart Rate:  [53-68] 55  Resp:  [14-19] 16  BP: ()/(57-81) 117/69    *SEE SUMMARY OF PERTINENT POSITIVE AND/OR NEGATIVE EXAM FINDINGS ABOVE*    General:  Appearance and activity were examined    Mental Status:   Level of consciousness, orientation, attention, memory, language use, and speech were examined within feasibile limits    Cranial Nerves:   Pupils:  were examined within feasibile limits, findings are normal or noted in the summary of findings above  Extraocular Movements:  were examined within feasibile limits, findings are normal or noted in the summary of findings above  Visual Fields:  were examined within feasibile limits, findings are normal or noted in the summary of findings above  Facial Sensation and Strength:  were examined within feasibile limits, findings are normal or noted in the summary of findings above  Palate:  was examined within feasibile limits, findings are normal or noted in the summary of findings above  Shoulder shrug: was examined within feasibile limits, findings are normal or noted in the summary of findings above  Tongue:  was examined within feasibile limits, findings are normal or noted in the summary of findings above    Motor:  All limbs' power and tone were examined within feasibile limits, findings are normal or noted in the summary of findings above    Sensory:   Tactile and relevant modes of sensation examined within feasibile limits, findings are normal or noted in the summary of findings above    Reflexes:   examined within feasibile limits, findings are normal or noted in the summary of findings above    Coordination:   Finger to nose and other  relevant meaures of coordination were examined within feasibile limits, findings are normal or noted in the summary of findings above    Station and Gait:  May or may not have been examined due to bed confinement, hospital fall precautions or other confounding factors. See summary of exam findings above.        Cardiovascular: Rate within normal range, regular rhythm, peripheral pulses are well palpable.      Diagnostics:    All pertinent labs and reports were reviewed.               Adult

## 2024-09-30 NOTE — OB RN PATIENT PROFILE - INSTRUCTED TO PATIENT: IF THE INFANT IS NOT PINK DURING SKIN TO SKIN, THE PARENTS IS TO SEEK ASSISTANCE IMMEDIATELY.
Luis Miguel needs to discuss the transplant order request - he said this is urgent - previously discussed with nurse Janan Cooks Statement Selected

## 2024-09-30 NOTE — DISCHARGE NOTE OB - MEDICATION SUMMARY - MEDICATIONS TO TAKE
I will START or STAY ON the medications listed below when I get home from the hospital:    ibuprofen 600 mg oral tablet  -- 1 tab(s) by mouth every 6 hours as needed for  moderate pain  -- Indication: For Pain    acetaminophen 325 mg oral tablet  -- 3 tab(s) by mouth every 6 hours as needed for  mild pain  -- Indication: For Pain    Prenatal Multivitamins with Folic Acid 1 mg oral tablet  -- 1 tab(s) by mouth once a day  -- Indication: For Supplement

## 2024-09-30 NOTE — DISCHARGE NOTE OB - CARE PLAN
Assessment and plan of treatment:	Routine PP/postop care   1 Principal Discharge DX:	Status post repeat low transverse  section  Assessment and plan of treatment:	Follow up in the office in 2 weeks for an incision check and in 6 weeks for a postpartum visit.   Regular diet. Resume normal activity as tolerated. No heavy lifting, driving, or strenuous activity for 2 weeks. Nothing per vagina such as tampons, intercourse, douches or tub baths for 6 weeks or until you see your doctor. Call your doctor with any signs and symptoms of infection such as fever, chills, nausea or vomiting. Call your doctor with redness or swelling at the incision site, fluid leakage or wound separation. Call your doctor if you're unable to tolerate food, increase in vaginal bleeding or have difficulty urinating. Call your doctor if you have pain that is not relieved by your prescribed medications. Notify your doctor with any other concerns.

## 2024-10-01 LAB
HCT VFR BLD CALC: 32 % — LOW (ref 34.5–45)
HGB BLD-MCNC: 10.7 G/DL — LOW (ref 11.5–15.5)
MCHC RBC-ENTMCNC: 30 PG — SIGNIFICANT CHANGE UP (ref 27–34)
MCHC RBC-ENTMCNC: 33.4 GM/DL — SIGNIFICANT CHANGE UP (ref 32–36)
MCV RBC AUTO: 89.6 FL — SIGNIFICANT CHANGE UP (ref 80–100)
NRBC # BLD: 0 /100 WBCS — SIGNIFICANT CHANGE UP (ref 0–0)
NRBC # FLD: 0 K/UL — SIGNIFICANT CHANGE UP (ref 0–0)
PLATELET # BLD AUTO: 283 K/UL — SIGNIFICANT CHANGE UP (ref 150–400)
RBC # BLD: 3.57 M/UL — LOW (ref 3.8–5.2)
RBC # FLD: 14.4 % — SIGNIFICANT CHANGE UP (ref 10.3–14.5)
T PALLIDUM AB TITR SER: NEGATIVE — SIGNIFICANT CHANGE UP
WBC # BLD: 13.24 K/UL — HIGH (ref 3.8–10.5)
WBC # FLD AUTO: 13.24 K/UL — HIGH (ref 3.8–10.5)

## 2024-10-01 RX ORDER — SODIUM CHLORIDE IRRIG SOLUTION 0.9 %
1000 SOLUTION, IRRIGATION IRRIGATION
Refills: 0 | Status: DISCONTINUED | OUTPATIENT
Start: 2024-10-01 | End: 2024-10-02

## 2024-10-01 RX ORDER — OXYCODONE HYDROCHLORIDE 30 MG/1
5 TABLET, FILM COATED, EXTENDED RELEASE ORAL
Refills: 0 | Status: DISCONTINUED | OUTPATIENT
Start: 2024-10-01 | End: 2024-10-02

## 2024-10-01 RX ORDER — DIPHENHYDRAMINE HCL 12.5MG/5ML
25 LIQUID (ML) ORAL EVERY 4 HOURS
Refills: 0 | Status: DISCONTINUED | OUTPATIENT
Start: 2024-10-01 | End: 2024-10-02

## 2024-10-01 RX ORDER — NALBUPHINE HYDROCHLORIDE 10 MG/ML
2.5 INJECTION, SOLUTION INTRAMUSCULAR; INTRAVENOUS; SUBCUTANEOUS EVERY 6 HOURS
Refills: 0 | Status: DISCONTINUED | OUTPATIENT
Start: 2024-10-01 | End: 2024-10-02

## 2024-10-01 RX ORDER — MORPHINE SULFATE 30 MG/1
0.15 TABLET, FILM COATED, EXTENDED RELEASE ORAL ONCE
Refills: 0 | Status: DISCONTINUED | OUTPATIENT
Start: 2024-10-01 | End: 2024-10-02

## 2024-10-01 RX ORDER — NALOXONE HYDROCHLORIDE 0.4 MG/ML
0.1 INJECTION, SOLUTION INTRAMUSCULAR; INTRAVENOUS; SUBCUTANEOUS
Refills: 0 | Status: DISCONTINUED | OUTPATIENT
Start: 2024-10-01 | End: 2024-10-02

## 2024-10-01 RX ADMIN — Medication 975 MILLIGRAM(S): at 08:43

## 2024-10-01 RX ADMIN — Medication 600 MILLIGRAM(S): at 17:28

## 2024-10-01 RX ADMIN — KETOROLAC TROMETHAMINE 30 MILLIGRAM(S): 10 TABLET, FILM COATED ORAL at 07:01

## 2024-10-01 RX ADMIN — Medication 5000 UNIT(S): at 17:25

## 2024-10-01 RX ADMIN — KETOROLAC TROMETHAMINE 30 MILLIGRAM(S): 10 TABLET, FILM COATED ORAL at 00:45

## 2024-10-01 RX ADMIN — Medication 975 MILLIGRAM(S): at 21:33

## 2024-10-01 RX ADMIN — Medication 975 MILLIGRAM(S): at 09:20

## 2024-10-01 RX ADMIN — Medication 600 MILLIGRAM(S): at 18:11

## 2024-10-01 RX ADMIN — Medication 325 MILLIGRAM(S): at 11:17

## 2024-10-01 RX ADMIN — KETOROLAC TROMETHAMINE 30 MILLIGRAM(S): 10 TABLET, FILM COATED ORAL at 11:18

## 2024-10-01 RX ADMIN — KETOROLAC TROMETHAMINE 30 MILLIGRAM(S): 10 TABLET, FILM COATED ORAL at 06:00

## 2024-10-01 RX ADMIN — KETOROLAC TROMETHAMINE 30 MILLIGRAM(S): 10 TABLET, FILM COATED ORAL at 12:00

## 2024-10-01 RX ADMIN — OXYCODONE HYDROCHLORIDE 5 MILLIGRAM(S): 30 TABLET, FILM COATED, EXTENDED RELEASE ORAL at 21:32

## 2024-10-01 RX ADMIN — Medication 5000 UNIT(S): at 06:00

## 2024-10-01 RX ADMIN — Medication 975 MILLIGRAM(S): at 15:30

## 2024-10-01 RX ADMIN — KETOROLAC TROMETHAMINE 30 MILLIGRAM(S): 10 TABLET, FILM COATED ORAL at 01:18

## 2024-10-01 RX ADMIN — Medication 2 TABLET(S): at 21:33

## 2024-10-01 RX ADMIN — Medication 975 MILLIGRAM(S): at 22:30

## 2024-10-01 RX ADMIN — Medication 1 TABLET(S): at 11:17

## 2024-10-01 RX ADMIN — Medication 975 MILLIGRAM(S): at 14:42

## 2024-10-01 NOTE — PROGRESS NOTE ADULT - ASSESSMENT
A/P: 23yo POD#1 s/p rLTCS s/p IM methergine and TXA. . Patient is stable and doing well post-operatively. VSS, AF.  - Continue regular diet.  - Increase ambulation.  - Continue motrin, tylenol, oxycodone PRN for pain control.   - Hct: 37.1->32.0    Rossi Bentley PGY1

## 2024-10-02 LAB
BASOPHILS # BLD AUTO: 0.02 K/UL — SIGNIFICANT CHANGE UP (ref 0–0.2)
BASOPHILS NFR BLD AUTO: 0.2 % — SIGNIFICANT CHANGE UP (ref 0–2)
EOSINOPHIL # BLD AUTO: 0.03 K/UL — SIGNIFICANT CHANGE UP (ref 0–0.5)
EOSINOPHIL NFR BLD AUTO: 0.3 % — SIGNIFICANT CHANGE UP (ref 0–6)
HCT VFR BLD CALC: 30.1 % — LOW (ref 34.5–45)
HGB BLD-MCNC: 9.9 G/DL — LOW (ref 11.5–15.5)
IANC: 8.1 K/UL — HIGH (ref 1.8–7.4)
IMM GRANULOCYTES NFR BLD AUTO: 0.4 % — SIGNIFICANT CHANGE UP (ref 0–0.9)
LYMPHOCYTES # BLD AUTO: 2.3 K/UL — SIGNIFICANT CHANGE UP (ref 1–3.3)
LYMPHOCYTES # BLD AUTO: 20.5 % — SIGNIFICANT CHANGE UP (ref 13–44)
MCHC RBC-ENTMCNC: 29.8 PG — SIGNIFICANT CHANGE UP (ref 27–34)
MCHC RBC-ENTMCNC: 32.9 GM/DL — SIGNIFICANT CHANGE UP (ref 32–36)
MCV RBC AUTO: 90.7 FL — SIGNIFICANT CHANGE UP (ref 80–100)
MONOCYTES # BLD AUTO: 0.7 K/UL — SIGNIFICANT CHANGE UP (ref 0–0.9)
MONOCYTES NFR BLD AUTO: 6.3 % — SIGNIFICANT CHANGE UP (ref 2–14)
NEUTROPHILS # BLD AUTO: 8.1 K/UL — HIGH (ref 1.8–7.4)
NEUTROPHILS NFR BLD AUTO: 72.3 % — SIGNIFICANT CHANGE UP (ref 43–77)
NRBC # BLD: 0 /100 WBCS — SIGNIFICANT CHANGE UP (ref 0–0)
NRBC # FLD: 0 K/UL — SIGNIFICANT CHANGE UP (ref 0–0)
PLATELET # BLD AUTO: 310 K/UL — SIGNIFICANT CHANGE UP (ref 150–400)
RBC # BLD: 3.32 M/UL — LOW (ref 3.8–5.2)
RBC # FLD: 14.4 % — SIGNIFICANT CHANGE UP (ref 10.3–14.5)
WBC # BLD: 11.2 K/UL — HIGH (ref 3.8–10.5)
WBC # FLD AUTO: 11.2 K/UL — HIGH (ref 3.8–10.5)

## 2024-10-02 RX ADMIN — Medication 600 MILLIGRAM(S): at 06:02

## 2024-10-02 RX ADMIN — Medication 975 MILLIGRAM(S): at 00:00

## 2024-10-02 RX ADMIN — Medication 600 MILLIGRAM(S): at 01:19

## 2024-10-02 RX ADMIN — Medication 600 MILLIGRAM(S): at 18:39

## 2024-10-02 RX ADMIN — Medication 975 MILLIGRAM(S): at 22:16

## 2024-10-02 RX ADMIN — Medication 600 MILLIGRAM(S): at 13:54

## 2024-10-02 RX ADMIN — Medication 325 MILLIGRAM(S): at 13:24

## 2024-10-02 RX ADMIN — Medication 5000 UNIT(S): at 18:40

## 2024-10-02 RX ADMIN — Medication 975 MILLIGRAM(S): at 16:27

## 2024-10-02 RX ADMIN — Medication 975 MILLIGRAM(S): at 09:30

## 2024-10-02 RX ADMIN — Medication 600 MILLIGRAM(S): at 02:00

## 2024-10-02 RX ADMIN — Medication 975 MILLIGRAM(S): at 10:00

## 2024-10-02 RX ADMIN — Medication 5000 UNIT(S): at 06:39

## 2024-10-02 RX ADMIN — Medication 1 TABLET(S): at 13:23

## 2024-10-02 RX ADMIN — Medication 975 MILLIGRAM(S): at 21:46

## 2024-10-02 RX ADMIN — Medication 600 MILLIGRAM(S): at 13:24

## 2024-10-02 RX ADMIN — Medication 2 TABLET(S): at 21:47

## 2024-10-02 NOTE — PROGRESS NOTE ADULT - NS ATTEND AMEND GEN_ALL_CORE FT
25yo now  POD#2 s/p rLTCS QBL 729ml.  Patient is stable and doing well post-operatively.    - Pain controlled on current regimen  - Ambulation encouraged  - Breastfeeding encouraged  - Regular diet  - Bleeding well controlled  - Incision healing well  - Discharge tomorrow POD3    J Goldberg MD

## 2024-10-03 VITALS
DIASTOLIC BLOOD PRESSURE: 62 MMHG | HEART RATE: 81 BPM | OXYGEN SATURATION: 100 % | SYSTOLIC BLOOD PRESSURE: 101 MMHG | RESPIRATION RATE: 18 BRPM | TEMPERATURE: 98 F

## 2024-10-03 RX ORDER — FERROUS SULFATE 325(65) MG
1 TABLET ORAL
Refills: 0 | DISCHARGE

## 2024-10-03 RX ORDER — PRENATAL VIT,CAL 76/IRON/FOLIC 29 MG-1 MG
1 TABLET ORAL
Qty: 0 | Refills: 0 | DISCHARGE
Start: 2024-10-03

## 2024-10-03 RX ORDER — ACETAMINOPHEN 325 MG
3 TABLET ORAL
Qty: 0 | Refills: 0 | DISCHARGE
Start: 2024-10-03

## 2024-10-03 RX ORDER — MULTI VITAMIN/MINERAL SUPPLEMENT WITH ASCORBIC ACID, NIACIN, PYRIDOXINE, PANTOTHENIC ACID, FOLIC ACID, RIBOFLAVIN, THIAMIN, BIOTIN, COBALAMIN AND ZINC. 60; 20; 12.5; 10; 10; 1.7; 1.5; 1; .3; .006 MG/1; MG/1; MG/1; MG/1; MG/1; MG/1; MG/1; MG/1; MG/1; MG/1
1 TABLET, COATED ORAL
Refills: 0 | DISCHARGE

## 2024-10-03 RX ADMIN — Medication 1 TABLET(S): at 13:33

## 2024-10-03 RX ADMIN — Medication 975 MILLIGRAM(S): at 03:45

## 2024-10-03 RX ADMIN — Medication 975 MILLIGRAM(S): at 09:25

## 2024-10-03 RX ADMIN — Medication 975 MILLIGRAM(S): at 09:55

## 2024-10-03 RX ADMIN — Medication 5000 UNIT(S): at 06:10

## 2024-10-03 RX ADMIN — Medication 600 MILLIGRAM(S): at 00:56

## 2024-10-03 RX ADMIN — Medication 600 MILLIGRAM(S): at 13:33

## 2024-10-03 RX ADMIN — Medication 600 MILLIGRAM(S): at 00:26

## 2024-10-03 RX ADMIN — Medication 600 MILLIGRAM(S): at 06:10

## 2024-10-03 RX ADMIN — Medication 600 MILLIGRAM(S): at 06:40

## 2024-10-03 RX ADMIN — Medication 975 MILLIGRAM(S): at 04:15

## 2024-10-03 RX ADMIN — Medication 600 MILLIGRAM(S): at 14:03

## 2024-10-03 NOTE — PROGRESS NOTE ADULT - SUBJECTIVE AND OBJECTIVE BOX
OB Progress Note:  Delivery, POD#1    S: 23yo POD#1 s/p rLTCS. Her pain is well controlled. She is tolerating a regular diet. Not yet passing flatus. Denies N/V. Denies CP/SOB/lightheadedness/dizziness. Denies fever/chills.  She is ambulating without difficulty.   Voiding spontaneously.     O:   Vital Signs Last 24 Hrs  T(C): 36.3 (01 Oct 2024 05:45), Max: 37.2 (01 Oct 2024 01:53)  T(F): 97.4 (01 Oct 2024 05:45), Max: 98.9 (01 Oct 2024 01:53)  HR: 66 (01 Oct 2024 05:45) (57 - 67)  BP: 98/66 (01 Oct 2024 05:45) (93/53 - 116/72)  BP(mean): 72 (01 Oct 2024 05:45) (62 - 84)  RR: 18 (01 Oct 2024 05:45) (12 - 18)  SpO2: 100% (01 Oct 2024 05:45) (97% - 100%)    Parameters below as of 01 Oct 2024 05:45  Patient On (Oxygen Delivery Method): room air        Labs:  Blood type: O Positive  Rubella IgG: RPR: Negative                          10.7[L]   13.24[H] >-----------< 283    ( 10-01 @ 06:23 )             32.0[L]                        12.5   19.29[H] >-----------< 263    (  @ 13:45 )             37.1                        12.0   7.30 >-----------< 286    (  @ 06:57 )             35.9                  PE:  General: NAD  Abdomen: Mildly distended, appropriately tender, incision c/d/i.  Extremities: No erythema, no pitting edema    
S: 25yo POD#3 s/p scheduled rLTCS, QBL 729ml, s/p TXA and IM methergine. The patient feels well. Pain is well controlled. She is tolerating a regular diet and passing flatus. She is voiding spontaneously, and ambulating without difficulty. Denies CP/SOB. Denies lightheadedness/dizziness. Denies N/V. Patient desires to be discharged home today.    O:  Vital Signs Last 24 Hrs  T(C): 36.7 (03 Oct 2024 06:57), Max: 36.7 (03 Oct 2024 06:57)  T(F): 98.1 (03 Oct 2024 06:57), Max: 98.1 (03 Oct 2024 06:57)  HR: 79 (03 Oct 2024 06:57) (79 - 82)  BP: 110/64 (03 Oct 2024 06:57) (108/66 - 110/64)  RR: 18 (03 Oct 2024 06:57) (17 - 18)  SpO2: 99% (03 Oct 2024 06:57) (99% - 100%)    Parameters below as of 03 Oct 2024 06:57  Patient On (Oxygen Delivery Method): room air      MEDICATIONS  (STANDING):  acetaminophen     Tablet .. 975 milliGRAM(s) Oral <User Schedule>  diphtheria/tetanus/pertussis (acellular) Vaccine (Adacel) 0.5 milliLiter(s) IntraMuscular once  ferrous    sulfate 325 milliGRAM(s) Oral daily  heparin   Injectable 5000 Unit(s) SubCutaneous every 12 hours  ibuprofen  Tablet. 600 milliGRAM(s) Oral every 6 hours  prenatal multivitamin 1 Tablet(s) Oral daily  senna 2 Tablet(s) Oral at bedtime    MEDICATIONS  (PRN):  acetaminophen     Tablet .. 975 milliGRAM(s) Oral every 6 hours PRN Mild Pain (1 - 3), Moderate Pain (4 - 6), Severe Pain (7 - 10)  diphenhydrAMINE 25 milliGRAM(s) Oral every 6 hours PRN Pruritus  lanolin Ointment 1 Application(s) Topical every 6 hours PRN Sore Nipples  magnesium hydroxide Suspension 30 milliLiter(s) Oral two times a day PRN Constipation  oxyCODONE    IR 5 milliGRAM(s) Oral every 3 hours PRN Moderate to Severe Pain (4-10)  oxyCODONE    IR 5 milliGRAM(s) Oral once PRN Moderate to Severe Pain (4-10)  simethicone 80 milliGRAM(s) Chew every 4 hours PRN Gas      LABS:  Blood type: O Positive  Rubella IgG: RPR: Negative                          9.9[L]   11.20[H] >-----------< 310    ( 10-02 @ 05:25 )             30.1[L]                        10.7[L]   13.24[H] >-----------< 283    ( 10-01 @ 06:23 )             32.0[L]                        12.5   19.29[H] >-----------< 263    ( 09-30 @ 13:45 )             37.1    PE:  General: NAD  Breasts: soft, nontender, non-engorged, b/l nipples intact  Abdomen: Soft, appropriately tender/nontender, non-distended, fundus firm  Incision: C/D/I with Dermabond  Pelvic: Lochia light  Lower Extremities: No edema, erythema, or calf tenderness    A/P: 25yo POD#3 s/p scheduled rLTCS, QBL 729ml. Patient is stable and is doing well post-operatively.    #Acute Blood Loss Anemia  -VSS  -Asymptomatic  -H&H: 12.5/32.0->10.7/32.0->9.9/30.1  -Continue PO Iron     #Postpartum  -Increase ambulation  -Continue abdominal binder use  -Continue regular diet and PO hydration  -Continue motrin, tylenol, oxycodone PRN for pain control.  -Discharge planning    Tracie MARINELLI-BC        
INTERVAL HPI/OVERNIGHT EVENTS:  24y Female s/p c section under spinal anesthesia with duramorph for post op analgesia on 9/30/24    Vital Signs Last 24 Hrs  T(C): 36.3 (01 Oct 2024 05:45), Max: 37.2 (01 Oct 2024 01:53)  T(F): 97.4 (01 Oct 2024 05:45), Max: 98.9 (01 Oct 2024 01:53)  HR: 66 (01 Oct 2024 05:45) (57 - 67)  BP: 98/66 (01 Oct 2024 05:45) (93/53 - 116/72)  BP(mean): 72 (01 Oct 2024 05:45) (62 - 84)  RR: 18 (01 Oct 2024 05:45) (12 - 18)  SpO2: 100% (01 Oct 2024 05:45) (97% - 100%)    Parameters below as of 01 Oct 2024 05:45  Patient On (Oxygen Delivery Method): room air            Patient's overall anesthesia satisfaction: positive    Patients pain is well controlled with current regimen    No respiratory events overnight    No pruritis at this time    Patient seen and doing well     No headache      No residual numbness or weakness, sensory and motor function intact    Site examined and dry, nontender    No anesthetic complications or complaints noted or reported          .            
Montserratian  # [id number] Gersonaterlolita    OB Progress Note: pTLCS, POD#2    S: 25yo now  POD#2 s/p rLTCS  ml. Pt seen and examine at bedside. No acute events overnight. Pain is well controlled. She is tolerating a regular diet and passing flatus/ no BM. She is voiding spontaneously, and ambulating without difficulty. Denies CP/SOB. Denies lightheadedness/dizziness. Denies N/V.    O:  Vitals:  Vital Signs Last 24 Hrs  T(C): 36.8 (02 Oct 2024 05:28), Max: 37 (01 Oct 2024 13:23)  T(F): 98.3 (02 Oct 2024 05:28), Max: 98.6 (01 Oct 2024 13:23)  HR: 53 (02 Oct 2024 05:28) (53 - 82)  BP: 100/58 (02 Oct 2024 05:28) (100/58 - 114/68)  BP(mean): --  RR: 18 (02 Oct 2024 05:28) (17 - 18)  SpO2: 100% (02 Oct 2024 05:28) (98% - 100%)    Parameters below as of 02 Oct 2024 05:28  Patient On (Oxygen Delivery Method): room air        MEDICATIONS  (STANDING):  acetaminophen     Tablet .. 975 milliGRAM(s) Oral <User Schedule>  diphtheria/tetanus/pertussis (acellular) Vaccine (Adacel) 0.5 milliLiter(s) IntraMuscular once  ferrous    sulfate 325 milliGRAM(s) Oral daily  heparin   Injectable 5000 Unit(s) SubCutaneous every 12 hours  ibuprofen  Tablet. 600 milliGRAM(s) Oral every 6 hours  prenatal multivitamin 1 Tablet(s) Oral daily  senna 2 Tablet(s) Oral at bedtime      MEDICATIONS  (PRN):  acetaminophen     Tablet .. 975 milliGRAM(s) Oral every 6 hours PRN Mild Pain (1 - 3), Moderate Pain (4 - 6), Severe Pain (7 - 10)  diphenhydrAMINE 25 milliGRAM(s) Oral every 6 hours PRN Pruritus  lanolin Ointment 1 Application(s) Topical every 6 hours PRN Sore Nipples  magnesium hydroxide Suspension 30 milliLiter(s) Oral two times a day PRN Constipation  oxyCODONE    IR 5 milliGRAM(s) Oral every 3 hours PRN Moderate to Severe Pain (4-10)  oxyCODONE    IR 5 milliGRAM(s) Oral once PRN Moderate to Severe Pain (4-10)  simethicone 80 milliGRAM(s) Chew every 4 hours PRN Gas      Labs:  Blood type: O Positive  Rubella IgG: RPR: Negative                          9.9[L]   11.20[H] >-----------< 310    ( 10-02 @ 05:25 )             30.1[L]                        10.7[L]   13.24[H] >-----------< 283    ( 10-01 @ 06:23 )             32.0[L]                        12.5   19.29[H] >-----------< 263    (  @ 13:45 )             37.1                        12.0   7.30 >-----------< 286    (  @ 06:57 )             35.9        PE:  General: NAD  Lungs: CTA b/l good airway entry  CVS: RRR S1S2  Abdomen: Soft, appropriately tender, incision c/d/i Firm @ umbilicus.  Extremities: No erythema, no pitting edema, pedal pulse 2+ bilateral    A/P: 25yo now  POD#2 s/p rLTCS QBL 729ml.  Patient is stable and doing well post-operatively.        Acute Blood Loss Anemia  -  ml  - Asymptomatic  - H/H 9.9/30.1 <-- 10.7/32.0 (12.5/30.7)  - c/w iron, and prenatal vitamin     Routine PP Care   - Continue Routine Postop/ PP care  - Continue regular diet.  - Increase ambulation.  - Continue Motrin, Tylenol, oxycodone PRN for pain control.   - Reviewed PEC precaution in detail pt verbalized understanding  - Discharge planning today  - F/u in MD office 1-2 wks          JAYLON Pandya-BC

## 2024-10-16 PROBLEM — O34.211 MATERNAL CARE FOR LOW TRANSVERSE SCAR FROM PREVIOUS CESAREAN DELIVERY: Chronic | Status: ACTIVE | Noted: 2024-09-23

## 2024-10-16 PROBLEM — Z86.69 PERSONAL HISTORY OF OTHER DISEASES OF THE NERVOUS SYSTEM AND SENSE ORGANS: Chronic | Status: ACTIVE | Noted: 2024-09-23

## 2024-10-18 ENCOUNTER — APPOINTMENT (OUTPATIENT)
Dept: OBGYN | Facility: CLINIC | Age: 24
End: 2024-10-18
Payer: MEDICAID

## 2024-10-18 VITALS
DIASTOLIC BLOOD PRESSURE: 72 MMHG | SYSTOLIC BLOOD PRESSURE: 107 MMHG | BODY MASS INDEX: 28.35 KG/M2 | WEIGHT: 160 LBS | HEIGHT: 63 IN

## 2024-10-18 DIAGNOSIS — Z3A.38 38 WEEKS GESTATION OF PREGNANCY: ICD-10-CM

## 2024-10-18 DIAGNOSIS — Z34.80 ENCOUNTER FOR SUPERVISION OF OTHER NORMAL PREGNANCY, UNSPECIFIED TRIMESTER: ICD-10-CM

## 2024-10-18 DIAGNOSIS — Z09 ENCOUNTER FOR FOLLOW-UP EXAMINATION AFTER COMPLETED TREATMENT FOR CONDITIONS OTHER THAN MALIGNANT NEOPLASM: ICD-10-CM

## 2024-10-18 PROCEDURE — 0503F POSTPARTUM CARE VISIT: CPT

## 2024-11-15 ENCOUNTER — APPOINTMENT (OUTPATIENT)
Dept: OBGYN | Facility: CLINIC | Age: 24
End: 2024-11-15

## 2024-11-15 VITALS — DIASTOLIC BLOOD PRESSURE: 72 MMHG | SYSTOLIC BLOOD PRESSURE: 109 MMHG | WEIGHT: 155 LBS

## 2024-11-15 DIAGNOSIS — Z09 ENCOUNTER FOR FOLLOW-UP EXAMINATION AFTER COMPLETED TREATMENT FOR CONDITIONS OTHER THAN MALIGNANT NEOPLASM: ICD-10-CM

## 2024-11-15 PROCEDURE — 0503F POSTPARTUM CARE VISIT: CPT

## 2024-11-15 RX ORDER — NORETHINDRONE ACETATE AND ETHINYL ESTRADIOL 1; 20 MG/1; UG/1
1-20 TABLET ORAL
Qty: 3 | Refills: 3 | Status: ACTIVE | COMMUNITY
Start: 2024-11-15 | End: 1900-01-01

## 2024-12-18 DIAGNOSIS — Z09 ENCOUNTER FOR FOLLOW-UP EXAMINATION AFTER COMPLETED TREATMENT FOR CONDITIONS OTHER THAN MALIGNANT NEOPLASM: ICD-10-CM

## 2024-12-18 DIAGNOSIS — O23.40 UNSPECIFIED INFECTION OF URINARY TRACT IN PREGNANCY, UNSPECIFIED TRIMESTER: ICD-10-CM

## 2024-12-20 ENCOUNTER — APPOINTMENT (OUTPATIENT)
Dept: ANTEPARTUM | Facility: CLINIC | Age: 24
End: 2024-12-20

## 2024-12-20 ENCOUNTER — APPOINTMENT (OUTPATIENT)
Dept: OBGYN | Facility: CLINIC | Age: 24
End: 2024-12-20
Payer: MEDICAID

## 2024-12-20 VITALS — DIASTOLIC BLOOD PRESSURE: 63 MMHG | SYSTOLIC BLOOD PRESSURE: 110 MMHG | WEIGHT: 155 LBS

## 2024-12-20 DIAGNOSIS — Z30.430 ENCOUNTER FOR INSERTION OF INTRAUTERINE CONTRACEPTIVE DEVICE: ICD-10-CM

## 2024-12-20 PROCEDURE — 58300 INSERT INTRAUTERINE DEVICE: CPT

## 2025-01-15 DIAGNOSIS — Z30.430 ENCOUNTER FOR INSERTION OF INTRAUTERINE CONTRACEPTIVE DEVICE: ICD-10-CM

## 2025-01-17 ENCOUNTER — APPOINTMENT (OUTPATIENT)
Dept: ANTEPARTUM | Facility: CLINIC | Age: 25
End: 2025-01-17
Payer: MEDICAID

## 2025-01-17 ENCOUNTER — APPOINTMENT (OUTPATIENT)
Dept: OBGYN | Facility: CLINIC | Age: 25
End: 2025-01-17
Payer: MEDICAID

## 2025-01-17 ENCOUNTER — ASOB RESULT (OUTPATIENT)
Age: 25
End: 2025-01-17

## 2025-01-17 VITALS — SYSTOLIC BLOOD PRESSURE: 106 MMHG | WEIGHT: 153 LBS | DIASTOLIC BLOOD PRESSURE: 68 MMHG

## 2025-01-17 DIAGNOSIS — Z97.5 PRESENCE OF (INTRAUTERINE) CONTRACEPTIVE DEVICE: ICD-10-CM

## 2025-01-17 DIAGNOSIS — R10.2 PELVIC AND PERINEAL PAIN: ICD-10-CM

## 2025-01-17 DIAGNOSIS — N92.6 IRREGULAR MENSTRUATION, UNSPECIFIED: ICD-10-CM

## 2025-01-17 PROCEDURE — 76857 US EXAM PELVIC LIMITED: CPT

## 2025-01-17 PROCEDURE — 99213 OFFICE O/P EST LOW 20 MIN: CPT

## 2025-01-17 PROCEDURE — 76830 TRANSVAGINAL US NON-OB: CPT

## 2025-01-17 RX ORDER — IBUPROFEN 600 MG/1
600 TABLET, FILM COATED ORAL
Qty: 90 | Refills: 3 | Status: ACTIVE | COMMUNITY
Start: 2025-01-17 | End: 1900-01-01

## 2025-01-17 RX ORDER — MEDROXYPROGESTERONE ACETATE 10 MG/1
10 TABLET ORAL
Qty: 90 | Refills: 3 | Status: ACTIVE | COMMUNITY
Start: 2025-01-17 | End: 1900-01-01

## 2025-02-14 ENCOUNTER — APPOINTMENT (OUTPATIENT)
Dept: OBGYN | Facility: CLINIC | Age: 25
End: 2025-02-14
Payer: MEDICAID

## 2025-02-14 DIAGNOSIS — Z01.419 ENCOUNTER FOR GYNECOLOGICAL EXAMINATION (GENERAL) (ROUTINE) W/OUT ABNORMAL FINDINGS: ICD-10-CM

## 2025-02-14 DIAGNOSIS — Z97.5 PRESENCE OF (INTRAUTERINE) CONTRACEPTIVE DEVICE: ICD-10-CM

## 2025-02-14 PROCEDURE — 99395 PREV VISIT EST AGE 18-39: CPT

## 2025-07-24 NOTE — OB PST NOTE - PRO MENTAL HEALTH SX RECENT
[Well Nourished] : well nourished [Interactive] : interactive [Looks Younger than Stated Years] : looks younger than stated years [WNL for age] : within normal limits of age [Normal S1 and S2] : normal S1 and S2 [Clear to Ausculation Bilaterally] : clear to auscultation bilaterally [Abdomen Soft] : soft [Abdomen Tenderness] : non-tender [Normal Appearance] : normal in appearance [Catarino Stage ___] : the Catarino stage for breast development was [unfilled] [Normal] : normal  [FreeTextEntry1] : R T3, L T2-3 [Scoliosis] : scoliosis not appreciated [Dysmorphic] : non-dysmorphic [Goiter] : no goiter [Murmur] : no murmurs [de-identified] : thin, very short, Ht SD -2.35,GV 5.3cm/yr, weight loss 0.9kg/3m, not wanting to talk, avoiding eye contact, somber [de-identified] : normal oropharynx [de-identified] : normal patellar DTRs none

## (undated) DEVICE — DRSG DERMABOND PRINEO 22CM